# Patient Record
Sex: FEMALE | Race: WHITE | NOT HISPANIC OR LATINO | Employment: OTHER | URBAN - METROPOLITAN AREA
[De-identification: names, ages, dates, MRNs, and addresses within clinical notes are randomized per-mention and may not be internally consistent; named-entity substitution may affect disease eponyms.]

---

## 2017-01-01 ENCOUNTER — TELEPHONE (OUTPATIENT)
Dept: GYNECOLOGIC ONCOLOGY | Facility: CLINIC | Age: 42
End: 2017-01-01

## 2017-01-01 ENCOUNTER — OFFICE VISIT (OUTPATIENT)
Dept: OBSTETRICS AND GYNECOLOGY | Facility: CLINIC | Age: 42
End: 2017-01-01
Payer: MEDICAID

## 2017-01-01 ENCOUNTER — TELEPHONE (OUTPATIENT)
Dept: OBSTETRICS AND GYNECOLOGY | Facility: CLINIC | Age: 42
End: 2017-01-01

## 2017-01-01 ENCOUNTER — INITIAL CONSULT (OUTPATIENT)
Dept: GYNECOLOGIC ONCOLOGY | Facility: CLINIC | Age: 42
End: 2017-01-01
Attending: OBSTETRICS & GYNECOLOGY
Payer: MEDICAID

## 2017-01-01 ENCOUNTER — SURGERY (OUTPATIENT)
Age: 42
End: 2017-01-01

## 2017-01-01 VITALS
BODY MASS INDEX: 17.43 KG/M2 | DIASTOLIC BLOOD PRESSURE: 88 MMHG | SYSTOLIC BLOOD PRESSURE: 119 MMHG | WEIGHT: 108 LBS | HEART RATE: 118 BPM

## 2017-01-01 VITALS
HEIGHT: 66 IN | BODY MASS INDEX: 17.12 KG/M2 | DIASTOLIC BLOOD PRESSURE: 70 MMHG | SYSTOLIC BLOOD PRESSURE: 102 MMHG | WEIGHT: 106.5 LBS

## 2017-01-01 DIAGNOSIS — C51.9 VULVAR CANCER: Primary | ICD-10-CM

## 2017-01-01 DIAGNOSIS — A63.0 CONDYLOMA ACUMINATA: Primary | ICD-10-CM

## 2017-01-01 DIAGNOSIS — R10.2 VULVAR PAIN: ICD-10-CM

## 2017-01-01 DIAGNOSIS — Z12.31 VISIT FOR SCREENING MAMMOGRAM: ICD-10-CM

## 2017-01-01 DIAGNOSIS — R10.2 VULVAR PAIN: Primary | ICD-10-CM

## 2017-01-01 PROCEDURE — 99213 OFFICE O/P EST LOW 20 MIN: CPT | Mod: PBBFAC,PO | Performed by: OBSTETRICS & GYNECOLOGY

## 2017-01-01 PROCEDURE — 99205 OFFICE O/P NEW HI 60 MIN: CPT | Mod: S$PBB,,, | Performed by: OBSTETRICS & GYNECOLOGY

## 2017-01-01 PROCEDURE — 99999 PR PBB SHADOW E&M-EST. PATIENT-LVL II: CPT | Mod: PBBFAC,,, | Performed by: OBSTETRICS & GYNECOLOGY

## 2017-01-01 PROCEDURE — 99212 OFFICE O/P EST SF 10 MIN: CPT | Mod: PBBFAC | Performed by: OBSTETRICS & GYNECOLOGY

## 2017-01-01 PROCEDURE — 3008F BODY MASS INDEX DOCD: CPT | Mod: ,,, | Performed by: OBSTETRICS & GYNECOLOGY

## 2017-01-01 PROCEDURE — 99386 PREV VISIT NEW AGE 40-64: CPT | Mod: S$PBB,,, | Performed by: OBSTETRICS & GYNECOLOGY

## 2017-01-01 PROCEDURE — 99999 PR PBB SHADOW E&M-EST. PATIENT-LVL III: CPT | Mod: PBBFAC,,, | Performed by: OBSTETRICS & GYNECOLOGY

## 2017-01-01 RX ORDER — LIDOCAINE HYDROCHLORIDE 20 MG/ML
JELLY TOPICAL
Qty: 30 ML | Refills: 1 | Status: SHIPPED | OUTPATIENT
Start: 2017-01-01 | End: 2018-01-01

## 2017-01-01 RX ORDER — OXYCODONE AND ACETAMINOPHEN 5; 325 MG/1; MG/1
1 TABLET ORAL EVERY 4 HOURS PRN
Qty: 30 TABLET | Refills: 0 | Status: SHIPPED | OUTPATIENT
Start: 2017-01-01 | End: 2018-01-01

## 2017-01-01 RX ORDER — OXYCODONE AND ACETAMINOPHEN 5; 325 MG/1; MG/1
1 TABLET ORAL EVERY 4 HOURS PRN
Qty: 30 TABLET | Refills: 0 | OUTPATIENT
Start: 2017-01-01

## 2017-07-18 NOTE — TELEPHONE ENCOUNTER
----- Message from Keli Lay sent at 7/18/2017  1:53 PM CDT -----  Pt has cyst in vag area, draining, and having some pain. Pt can be reached at [phone] or [phone].

## 2017-07-26 NOTE — PROGRESS NOTES
SUBJECTIVE:   42 y.o. female .  for annual routine Pap and checkup. No LMP recorded..  She complains of vulvar lesion for over a year that started right tenzin-rectal area.  It has gotten progressively larger.  She was seen by a physician on Monday who treated her with south for superinfection.  Her last pap was 5 years ago.  She denies abnormal pap.  She has never had a mammogram.  She describes a 30# weight loss.  She has no difficulty urinating, but sitting and walking are uncomfortable.        History reviewed. No pertinent past medical history.  Past Surgical History:   Procedure Laterality Date     SECTION      4     Social History     Social History    Marital status: Single     Spouse name: N/A    Number of children: N/A    Years of education: N/A     Occupational History    Not on file.     Social History Main Topics    Smoking status: Current Every Day Smoker    Smokeless tobacco: Never Used    Alcohol use No    Drug use: No    Sexual activity: No     Other Topics Concern    Not on file     Social History Narrative    No narrative on file     Family History   Problem Relation Age of Onset    Throat cancer Maternal Grandmother     Breast cancer Mother      OB History   No data available         No current outpatient prescriptions on file.     No current facility-administered medications for this visit.      Allergies: Review of patient's allergies indicates no known allergies.     ROS:  Constitutional: 30# weight loss,no weight gain, fever, fatigue  Eyes:  No vision changes, glasses/contacts  ENT/Mouth: No ulcers, sinus problems, ears ringing, headache  Cardiovascular: No inability to lie flat, chest pain, exercise intolerance, swelling, heart palpitations  Respiratory: No wheezing, coughing blood, shortness of breath, or cough  Gastrointestinal: No diarrhea, bloody stool, nausea/vomiting, constipation, gas, hemorrhoids  Genitourinary: No blood in urine, painful urination, urgency of  "urination, frequency of urination, incomplete emptying, incontinence, abnormal bleeding, painful periods, heavy periods, vaginal discharge, vaginal odor, painful intercourse, sexual problems, bleeding after intercourse.  Musculoskeletal: No muscle weakness  Skin/Breast: No painful breasts, nipple discharge, masses, rash, ulcers  Neurological: No passing out, seizures, numbness, headache  Endocrine: No diabetes, hypothyroid, hyperthyroid, hot flashes, hair loss, abnormal hair growth, ance  Psychiatric: No depression, crying  Hematologic: No bruises, bleeding, swollen lymph nodes, anemia.      OBJECTIVE:   The patient appears well, alert, oriented x 3, in no distress.  /70   Ht 5' 6" (1.676 m)   Wt 48.3 kg (106 lb 7.7 oz)   BMI 17.19 kg/m²   NECK: no thyromegaly, trachea midline  SKIN: no acne, striae, hirsutism  BREAST EXAM: breasts appear normal, no suspicious masses, no skin or nipple changes or axillary nodes  ABDOMEN: no hernias, masses, or hepatosplenomegaly  GENITALIA: large condyloma obstructing the introitus and extending down the perineum to the right of the rectum.  No erythema.  I can pass a finger into the vagina and do not feel any extension  URETHRA: normal urethra, normal urethral meatus  VAGINA: patent to finger  NODES: no inguinal lymphadenopathy    \  ASSESSMENT:   .Sarahi was seen today for other.    Diagnoses and all orders for this visit:    Condyloma acuminata  -     Ambulatory consult to Gynecologic Oncology    Visit for screening mammogram  -     Mammo Digital Screening Bilat with CAD; Future    Discussed with Dr. Bruner.  Will consult gyn onc for incision.  "

## 2017-08-07 NOTE — LETTER
August 7, 2017      Minnie Gonzalez MD  [address]  [address]  [facility] [address]           [facility] - Gynecologic Oncology  [address], [address]  [facility] [address]  Phone: [phone]  Fax: [phone]          Patient: Sarahi Marquez   MR Number: [id number]   YOB: 1975   Date of Visit: 8/7/2017       Dear Dr. Minnie Gonzalez:    Thank you for referring Sarahi Marquez to me for evaluation. Attached you will find relevant portions of my assessment and plan of care.    If you have questions, please do not hesitate to call me. I look forward to following Sarahi Marquez along with you.    Sincerely,    Robert Bruner MD    Enclosure  CC:  No Recipients    If you would like to receive this communication electronically, please contact [email or web address] or [phone] to request more information on [facility] access.    For providers and/or their staff who would like to refer a patient to [facility], please contact us through our one-stop-shop provider referral line, [facility], at [phone].    If you feel you have received this communication in error or would no longer like to receive these types of communications, please e-mail [email or web address]

## 2017-08-07 NOTE — TELEPHONE ENCOUNTER
----- Message from Frances Astorga sent at 8/7/2017 12:21 PM CDT -----  Contact: Self  Good afternoon,    Pt stated the dr was suppose to call in some lidocane cream for her and she has not heard back yet. Pt stated she called pharmacy and it's not there.    Pt can be reached at [phone] or [phone]     Thank you!

## 2017-08-07 NOTE — PROGRESS NOTES
Subjective:       Patient ID: Sarahi Marquez is a 42 y.o. female.    Chief Complaint: condyloma (Referred by Dr. Gonzalez)    HPI     Ms. Marquez is a 42yr old para 4 who presents today as a referral from Dr. ALEJANDRO Gonzalez for evaluation of a condyloma. Pt reports started as a small ball around the vagina that has grown for a year. Pt reports pain when sitting and walking, pain scale 7-8/10.   Pt also reports trouble swallowing and eating with 30# weight loss in the last 3 months (BMI 17). LMP >10yr ago. No history of abnormal paps. Last pap 5 yr ago. No new partners. No history of STI's.  So significant medical history. Surgical: LTV C/S x 4. FMH: M-breast CA (64, alive); MGM-throat CA (smoker) Pt has 4pk/d x 25yr tobacco use; decreased to <1pk a day now. No ETOH or drug use.      Review of Systems   Constitutional: Positive for appetite change and unexpected weight change. Negative for chills, diaphoresis, fatigue and fever.   HENT: Positive for trouble swallowing.    Respiratory: Positive for cough. Negative for chest tightness and shortness of breath.    Cardiovascular: Negative for chest pain, palpitations and leg swelling.   Gastrointestinal: Positive for constipation. Negative for abdominal pain, diarrhea, nausea and vomiting.   Genitourinary: Positive for dysuria, genital sores and vaginal pain. Negative for difficulty urinating, flank pain, frequency, hematuria, pelvic pain, urgency and vaginal discharge.   Skin: Negative for color change.   Neurological: Negative for dizziness, light-headedness and headaches.   Hematological: Negative for adenopathy.   Psychiatric/Behavioral: Negative for agitation. The patient is nervous/anxious.    All other systems reviewed and are negative.      Objective:      Physical Exam   Constitutional: She is oriented to person, place, and time. She appears well-developed. No distress.   cachexic   HENT:   Head: Normocephalic.   Eyes: No scleral icterus.   Neck: Normal range of motion. No  thyromegaly present.   Pulmonary/Chest: Effort normal. No respiratory distress. She has no wheezes. She has no rales.   Abdominal: Soft. She exhibits no distension. There is no tenderness.   Genitourinary:       No breast tenderness. Pelvic exam was performed with patient supine. There is lesion on the right labia. There is lesion on the left labia.   Genitourinary Comments: Unable to assess by internal exam. Externally cancer extending from 3-9 o'clock on labia bilaterally and eroding into the peritoneal body.   Musculoskeletal: Normal range of motion. She exhibits no edema or tenderness.   Lymphadenopathy:     She has no cervical adenopathy.   Neurological: She is alert and oriented to person, place, and time.   Skin: Skin is warm and dry. No rash noted. She is not diaphoretic. No erythema. No pallor.   Psychiatric: She has a normal mood and affect. Her behavior is normal.   Vitals reviewed.      Assessment:       1. Vulvar cancer        Plan:       - Needs tissue diagnosis for what appears to be a stage 3 vulvar cancer. Will schedule for EUA, vulvar biopsies  - schedule with ENT for evaluation of inability to swallow, weight loss, family history, possible concurrent bronchoscopy while under anesthesia  - The risks, benefits, and indications for surgery were discussed with the patient. These included bleeding, infection, damage to surrounding tissues, and the possibility of major complications including death. She voiced understanding, all questions were answered and consents were signed.

## 2017-08-07 NOTE — Clinical Note
I think this is probably a vulvar cancer.  In addition, she may have an esophageal versus laryngeal cancer as well.  Will let you know. JE

## 2017-08-24 NOTE — TELEPHONE ENCOUNTER
----- Message from Bryon Maher sent at 8/24/2017 11:39 AM CDT -----  Contact: pt   Pt will to r/s surgery     Pt will like to prescribed to an antibiotics and pain med    Pt contact: [phone]

## 2017-08-25 NOTE — TELEPHONE ENCOUNTER
Returned phone call again. Person who answered the phone said she left the house again. No other number available.

## 2017-08-25 NOTE — TELEPHONE ENCOUNTER
Patient requesting antibiotics and pain medications. Called to speak with patient to understand more about her requests and what I could do to help. She was not available but the person that answered said she would give her a message to call our office back. The person also states that there is no alternative number.

## 2017-08-25 NOTE — TELEPHONE ENCOUNTER
----- Message from Gloria Rice sent at 8/25/2017  1:57 PM CDT -----  Sarahi Jimmy said she is needing antibiotics & pain medications/pt said it is difficult to sit down/pt can be reached at [phone]       [facility]# [id number]

## 2017-08-28 NOTE — TELEPHONE ENCOUNTER
----- Message from Emma Galindo MD sent at 8/25/2017  1:33 PM CDT -----  Contact: pt   I tried to call this patient to understand more about what she needs. If you hear from her let me know so I can see how I can help. I didn't just want to send random antibiotics to the pharmacy. She needs the EUA with Zohreh.   ----- Message -----  From: Eunice Villa MA  Sent: 8/24/2017   2:50 PM  To: Emma Galindo MD    Pt is requesting  pain meds an abx for drainage  ----- Message -----  From: Bryon Maher  Sent: 8/24/2017  11:39 AM  To: [facility] Staff    Pt will to r/s surgery     Pt will like to prescribed to an antibiotics and pain med    Pt contact: [phone]

## 2017-08-28 NOTE — TELEPHONE ENCOUNTER
----- Message from Gloria Rice sent at 8/28/2017  2:24 PM CDT -----  Sarahi Marquez is needing pain meds to be sent to [facility]/# [phone]     Ms Marquez can be reached at [phone]    [facility]# [id number]

## 2017-09-20 NOTE — TELEPHONE ENCOUNTER
----- Message from Bryon Maher sent at 9/20/2017  4:41 PM CDT -----  Contact: Pt  Pt states she was supposed to be prescribed to a new medication to relieve pain, but still hasn't received anything     Contact::[phone]

## 2017-09-20 NOTE — TELEPHONE ENCOUNTER
----- Message from Gloria Rice sent at 9/20/2017  2:43 PM CDT -----  Sarahi Marquez is calling for more pain medication & needs a stronger dosage/pt can be reached at [phone]

## 2017-10-09 NOTE — TELEPHONE ENCOUNTER
----- Message from Susie Elizabeth sent at 10/9/2017 12:16 PM CDT -----  Contact: pt      _  1st Request  _  2nd Request  _  3rd Request    Please refill the medication(s) listed below. Please call the patient when the prescription(s) is ready for  at this phone number      [phone]      Medication #1hydrocodone-acetaminophen 7.5-325mg (NORCO) 7.5-325 mg per tablet - she wants a stronger dose    Medication #2      Preferred [facility]acy:[facility] in [place]

## 2017-10-09 NOTE — TELEPHONE ENCOUNTER
10/09/17 advised pt cliic appt is needed before pain meds can be refilled. Pt schd to see Dr. Bruner on 10/13 @ 10:30 am. Pt is aware of appt time. TA/MA

## 2018-01-01 ENCOUNTER — HOSPITAL ENCOUNTER (INPATIENT)
Facility: OTHER | Age: 43
LOS: 3 days | DRG: 917 | End: 2018-03-08
Attending: HOSPITALIST | Admitting: HOSPITALIST
Payer: MEDICAID

## 2018-01-01 VITALS
SYSTOLIC BLOOD PRESSURE: 92 MMHG | BODY MASS INDEX: 17.96 KG/M2 | WEIGHT: 107.81 LBS | HEART RATE: 106 BPM | TEMPERATURE: 95 F | DIASTOLIC BLOOD PRESSURE: 62 MMHG | OXYGEN SATURATION: 45 % | RESPIRATION RATE: 10 BRPM | HEIGHT: 65 IN

## 2018-01-01 DIAGNOSIS — I46.9 CARDIAC ARREST: Primary | ICD-10-CM

## 2018-01-01 DIAGNOSIS — R41.9 DECREASED ALERTNESS: ICD-10-CM

## 2018-01-01 LAB
ABO + RH BLD: NORMAL
ALBUMIN SERPL BCP-MCNC: 1.5 G/DL
ALBUMIN SERPL BCP-MCNC: 1.6 G/DL
ALBUMIN SERPL BCP-MCNC: 1.7 G/DL
ALBUMIN SERPL BCP-MCNC: 1.7 G/DL
ALBUMIN SERPL BCP-MCNC: 1.8 G/DL
ALBUMIN SERPL BCP-MCNC: 1.8 G/DL
ALBUMIN SERPL BCP-MCNC: 1.9 G/DL
ALBUMIN SERPL BCP-MCNC: 2.1 G/DL
ALBUMIN SERPL BCP-MCNC: 2.2 G/DL
ALBUMIN SERPL BCP-MCNC: 2.3 G/DL
ALLENS TEST: ABNORMAL
ALP SERPL-CCNC: 103 U/L
ALP SERPL-CCNC: 66 U/L
ALP SERPL-CCNC: 68 U/L
ALP SERPL-CCNC: 70 U/L
ALP SERPL-CCNC: 73 U/L
ALP SERPL-CCNC: 75 U/L
ALP SERPL-CCNC: 81 U/L
ALP SERPL-CCNC: 87 U/L
ALP SERPL-CCNC: 91 U/L
ALP SERPL-CCNC: 96 U/L
ALT SERPL W/O P-5'-P-CCNC: 33 U/L
ALT SERPL W/O P-5'-P-CCNC: 37 U/L
ALT SERPL W/O P-5'-P-CCNC: 37 U/L
ALT SERPL W/O P-5'-P-CCNC: 38 U/L
ALT SERPL W/O P-5'-P-CCNC: 40 U/L
ALT SERPL W/O P-5'-P-CCNC: 42 U/L
ALT SERPL W/O P-5'-P-CCNC: 44 U/L
ALT SERPL W/O P-5'-P-CCNC: 48 U/L
ALT SERPL W/O P-5'-P-CCNC: 52 U/L
ALT SERPL W/O P-5'-P-CCNC: 56 U/L
ALT SERPL W/O P-5'-P-CCNC: 58 U/L
ALT SERPL W/O P-5'-P-CCNC: 61 U/L
AMORPH CRY URNS QL MICRO: ABNORMAL
ANION GAP SERPL CALC-SCNC: 12 MMOL/L
ANION GAP SERPL CALC-SCNC: 12 MMOL/L
ANION GAP SERPL CALC-SCNC: 13 MMOL/L
ANION GAP SERPL CALC-SCNC: 13 MMOL/L
ANION GAP SERPL CALC-SCNC: 14 MMOL/L
ANION GAP SERPL CALC-SCNC: 15 MMOL/L
ANION GAP SERPL CALC-SCNC: 15 MMOL/L
ANION GAP SERPL CALC-SCNC: 16 MMOL/L
ANION GAP SERPL CALC-SCNC: 17 MMOL/L
ANISOCYTOSIS BLD QL SMEAR: ABNORMAL
ANISOCYTOSIS BLD QL SMEAR: SLIGHT
APTT BLDCRRT: 30.8 SEC
APTT BLDCRRT: 30.9 SEC
APTT BLDCRRT: 31.4 SEC
APTT BLDCRRT: 31.9 SEC
APTT BLDCRRT: 32.6 SEC
APTT BLDCRRT: 35.2 SEC
APTT BLDCRRT: 38.3 SEC
APTT BLDCRRT: 38.3 SEC
APTT BLDCRRT: 38.7 SEC
APTT BLDCRRT: 41.8 SEC
APTT BLDCRRT: 42.5 SEC
APTT BLDCRRT: 47.6 SEC
AST SERPL-CCNC: 117 U/L
AST SERPL-CCNC: 126 U/L
AST SERPL-CCNC: 145 U/L
AST SERPL-CCNC: 176 U/L
AST SERPL-CCNC: 217 U/L
AST SERPL-CCNC: 243 U/L
AST SERPL-CCNC: 257 U/L
AST SERPL-CCNC: 265 U/L
AST SERPL-CCNC: 282 U/L
AST SERPL-CCNC: 289 U/L
AST SERPL-CCNC: 79 U/L
AST SERPL-CCNC: 96 U/L
BACTERIA #/AREA URNS HPF: ABNORMAL /HPF
BACTERIA SPEC AEROBE CULT: NORMAL
BACTERIA UR CULT: NO GROWTH
BASO STIPL BLD QL SMEAR: ABNORMAL
BASOPHILS # BLD AUTO: 0.01 K/UL
BASOPHILS # BLD AUTO: 0.02 K/UL
BASOPHILS # BLD AUTO: 0.03 K/UL
BASOPHILS # BLD AUTO: 0.07 K/UL
BASOPHILS # BLD AUTO: 0.07 K/UL
BASOPHILS # BLD AUTO: ABNORMAL K/UL
BASOPHILS NFR BLD: 0 %
BASOPHILS NFR BLD: 0.1 %
BASOPHILS NFR BLD: 0.2 %
BASOPHILS NFR BLD: 0.3 %
BASOPHILS NFR BLD: 0.3 %
BASOPHILS NFR BLD: 0.8 %
BASOPHILS NFR BLD: 1 %
BILIRUB SERPL-MCNC: 0.2 MG/DL
BILIRUB SERPL-MCNC: 0.3 MG/DL
BILIRUB SERPL-MCNC: 0.3 MG/DL
BILIRUB SERPL-MCNC: 0.4 MG/DL
BILIRUB SERPL-MCNC: 0.5 MG/DL
BILIRUB SERPL-MCNC: 0.6 MG/DL
BILIRUB SERPL-MCNC: 0.7 MG/DL
BILIRUB UR QL STRIP: NEGATIVE
BLD GP AB SCN CELLS X3 SERPL QL: NORMAL
BNP SERPL-MCNC: 27 PG/ML
BUN SERPL-MCNC: 17 MG/DL
BUN SERPL-MCNC: 18 MG/DL
BUN SERPL-MCNC: 20 MG/DL
BUN SERPL-MCNC: 24 MG/DL
BUN SERPL-MCNC: 26 MG/DL
BUN SERPL-MCNC: 27 MG/DL
BUN SERPL-MCNC: 30 MG/DL
BUN SERPL-MCNC: 32 MG/DL
BUN SERPL-MCNC: 33 MG/DL
BUN SERPL-MCNC: 36 MG/DL
BUN SERPL-MCNC: 39 MG/DL
BUN SERPL-MCNC: 42 MG/DL
BURR CELLS BLD QL SMEAR: ABNORMAL
CA-I BLDV-SCNC: 1.08 MMOL/L
CA-I BLDV-SCNC: 1.09 MMOL/L
CA-I BLDV-SCNC: 1.09 MMOL/L
CA-I BLDV-SCNC: 1.1 MMOL/L
CA-I BLDV-SCNC: 1.1 MMOL/L
CA-I BLDV-SCNC: 1.14 MMOL/L
CA-I BLDV-SCNC: 1.17 MMOL/L
CALCIUM SERPL-MCNC: 7.8 MG/DL
CALCIUM SERPL-MCNC: 7.8 MG/DL
CALCIUM SERPL-MCNC: 7.9 MG/DL
CALCIUM SERPL-MCNC: 8.1 MG/DL
CALCIUM SERPL-MCNC: 8.1 MG/DL
CALCIUM SERPL-MCNC: 8.3 MG/DL
CALCIUM SERPL-MCNC: 8.4 MG/DL
CALCIUM SERPL-MCNC: 8.4 MG/DL
CALCIUM SERPL-MCNC: 8.5 MG/DL
CALCIUM SERPL-MCNC: 8.7 MG/DL
CALCIUM SERPL-MCNC: 9 MG/DL
CALCIUM SERPL-MCNC: 9.1 MG/DL
CHLORIDE SERPL-SCNC: 100 MMOL/L
CHLORIDE SERPL-SCNC: 100 MMOL/L
CHLORIDE SERPL-SCNC: 101 MMOL/L
CHLORIDE SERPL-SCNC: 102 MMOL/L
CHLORIDE SERPL-SCNC: 105 MMOL/L
CHLORIDE SERPL-SCNC: 107 MMOL/L
CHLORIDE SERPL-SCNC: 107 MMOL/L
CHLORIDE SERPL-SCNC: 99 MMOL/L
CHLORIDE SERPL-SCNC: 99 MMOL/L
CHOLEST SERPL-MCNC: 109 MG/DL
CHOLEST/HDLC SERPL: 3.4 {RATIO}
CK SERPL-CCNC: 6447 U/L
CLARITY UR: CLEAR
CO2 SERPL-SCNC: 16 MMOL/L
CO2 SERPL-SCNC: 17 MMOL/L
CO2 SERPL-SCNC: 17 MMOL/L
CO2 SERPL-SCNC: 18 MMOL/L
CO2 SERPL-SCNC: 18 MMOL/L
CO2 SERPL-SCNC: 19 MMOL/L
CO2 SERPL-SCNC: 20 MMOL/L
CO2 SERPL-SCNC: 21 MMOL/L
CO2 SERPL-SCNC: 22 MMOL/L
COLOR UR: YELLOW
CREAT SERPL-MCNC: 1.3 MG/DL
CREAT SERPL-MCNC: 1.5 MG/DL
CREAT SERPL-MCNC: 1.7 MG/DL
CREAT SERPL-MCNC: 2.1 MG/DL
CREAT SERPL-MCNC: 2.2 MG/DL
CREAT SERPL-MCNC: 2.5 MG/DL
CREAT SERPL-MCNC: 2.9 MG/DL
CREAT SERPL-MCNC: 3.4 MG/DL
CREAT SERPL-MCNC: 3.6 MG/DL
CREAT SERPL-MCNC: 4 MG/DL
CREAT SERPL-MCNC: 4.4 MG/DL
CREAT SERPL-MCNC: 4.8 MG/DL
DELSYS: ABNORMAL
DIASTOLIC DYSFUNCTION: YES
DIFFERENTIAL METHOD: ABNORMAL
EOSINOPHIL # BLD AUTO: 0 K/UL
EOSINOPHIL # BLD AUTO: 0.1 K/UL
EOSINOPHIL # BLD AUTO: 0.2 K/UL
EOSINOPHIL # BLD AUTO: 0.2 K/UL
EOSINOPHIL # BLD AUTO: ABNORMAL K/UL
EOSINOPHIL NFR BLD: 0 %
EOSINOPHIL NFR BLD: 0.3 %
EOSINOPHIL NFR BLD: 0.3 %
EOSINOPHIL NFR BLD: 0.4 %
EOSINOPHIL NFR BLD: 0.9 %
EOSINOPHIL NFR BLD: 1 %
EOSINOPHIL NFR BLD: 1.3 %
EOSINOPHIL NFR BLD: 1.6 %
EOSINOPHIL NFR BLD: 1.8 %
EOSINOPHIL NFR BLD: 2 %
ERYTHROCYTE [DISTWIDTH] IN BLOOD BY AUTOMATED COUNT: 21.4 %
ERYTHROCYTE [DISTWIDTH] IN BLOOD BY AUTOMATED COUNT: 21.5 %
ERYTHROCYTE [DISTWIDTH] IN BLOOD BY AUTOMATED COUNT: 21.5 %
ERYTHROCYTE [DISTWIDTH] IN BLOOD BY AUTOMATED COUNT: 21.7 %
ERYTHROCYTE [DISTWIDTH] IN BLOOD BY AUTOMATED COUNT: 21.8 %
ERYTHROCYTE [DISTWIDTH] IN BLOOD BY AUTOMATED COUNT: 21.9 %
ERYTHROCYTE [DISTWIDTH] IN BLOOD BY AUTOMATED COUNT: 22 %
ERYTHROCYTE [DISTWIDTH] IN BLOOD BY AUTOMATED COUNT: 22.3 %
ERYTHROCYTE [SEDIMENTATION RATE] IN BLOOD BY WESTERGREN METHOD: 20 MM/H
ERYTHROCYTE [SEDIMENTATION RATE] IN BLOOD BY WESTERGREN METHOD: 24 MM/H
ERYTHROCYTE [SEDIMENTATION RATE] IN BLOOD BY WESTERGREN METHOD: 26 MM/H
ERYTHROCYTE [SEDIMENTATION RATE] IN BLOOD BY WESTERGREN METHOD: 26 MM/H
EST. GFR  (AFRICAN AMERICAN): 12 ML/MIN/1.73 M^2
EST. GFR  (AFRICAN AMERICAN): 13 ML/MIN/1.73 M^2
EST. GFR  (AFRICAN AMERICAN): 15 ML/MIN/1.73 M^2
EST. GFR  (AFRICAN AMERICAN): 17 ML/MIN/1.73 M^2
EST. GFR  (AFRICAN AMERICAN): 18 ML/MIN/1.73 M^2
EST. GFR  (AFRICAN AMERICAN): 22 ML/MIN/1.73 M^2
EST. GFR  (AFRICAN AMERICAN): 26 ML/MIN/1.73 M^2
EST. GFR  (AFRICAN AMERICAN): 30 ML/MIN/1.73 M^2
EST. GFR  (AFRICAN AMERICAN): 32 ML/MIN/1.73 M^2
EST. GFR  (AFRICAN AMERICAN): 41 ML/MIN/1.73 M^2
EST. GFR  (AFRICAN AMERICAN): 48 ML/MIN/1.73 M^2
EST. GFR  (AFRICAN AMERICAN): 57 ML/MIN/1.73 M^2
EST. GFR  (NON AFRICAN AMERICAN): 10 ML/MIN/1.73 M^2
EST. GFR  (NON AFRICAN AMERICAN): 11 ML/MIN/1.73 M^2
EST. GFR  (NON AFRICAN AMERICAN): 13 ML/MIN/1.73 M^2
EST. GFR  (NON AFRICAN AMERICAN): 14 ML/MIN/1.73 M^2
EST. GFR  (NON AFRICAN AMERICAN): 16 ML/MIN/1.73 M^2
EST. GFR  (NON AFRICAN AMERICAN): 19 ML/MIN/1.73 M^2
EST. GFR  (NON AFRICAN AMERICAN): 23 ML/MIN/1.73 M^2
EST. GFR  (NON AFRICAN AMERICAN): 26 ML/MIN/1.73 M^2
EST. GFR  (NON AFRICAN AMERICAN): 28 ML/MIN/1.73 M^2
EST. GFR  (NON AFRICAN AMERICAN): 36 ML/MIN/1.73 M^2
EST. GFR  (NON AFRICAN AMERICAN): 42 ML/MIN/1.73 M^2
EST. GFR  (NON AFRICAN AMERICAN): 50 ML/MIN/1.73 M^2
ESTIMATED PA SYSTOLIC PRESSURE: 7.84
ETCO2: 20
ETCO2: 23
ETCO2: 23
ETCO2: 24
FIO2: 40
FIO2: 40
FIO2: 50
FIO2: 50
GIANT PLATELETS BLD QL SMEAR: PRESENT
GIANT PLATELETS BLD QL SMEAR: PRESENT
GLUCOSE SERPL-MCNC: 100 MG/DL
GLUCOSE SERPL-MCNC: 101 MG/DL
GLUCOSE SERPL-MCNC: 105 MG/DL
GLUCOSE SERPL-MCNC: 109 MG/DL
GLUCOSE SERPL-MCNC: 118 MG/DL
GLUCOSE SERPL-MCNC: 241 MG/DL
GLUCOSE SERPL-MCNC: 296 MG/DL
GLUCOSE SERPL-MCNC: 60 MG/DL
GLUCOSE SERPL-MCNC: 64 MG/DL
GLUCOSE SERPL-MCNC: 87 MG/DL
GLUCOSE SERPL-MCNC: 97 MG/DL
GLUCOSE SERPL-MCNC: 98 MG/DL
GLUCOSE SERPL-MCNC: 98 MG/DL
GLUCOSE UR QL STRIP: ABNORMAL
GRAM STN SPEC: NORMAL
HCO3 UR-SCNC: 20.6 MMOL/L (ref 24–28)
HCO3 UR-SCNC: 20.7 MMOL/L (ref 24–28)
HCO3 UR-SCNC: 21 MMOL/L (ref 24–28)
HCO3 UR-SCNC: 21.6 MMOL/L (ref 24–28)
HCT VFR BLD AUTO: 19.4 %
HCT VFR BLD AUTO: 21.2 %
HCT VFR BLD AUTO: 24.3 %
HCT VFR BLD AUTO: 25 %
HCT VFR BLD AUTO: 25.3 %
HCT VFR BLD AUTO: 26.9 %
HCT VFR BLD AUTO: 28.3 %
HCT VFR BLD AUTO: 29.6 %
HCT VFR BLD AUTO: 30.1 %
HCT VFR BLD AUTO: 30.8 %
HCT VFR BLD AUTO: 31.9 %
HCT VFR BLD AUTO: 33 %
HDLC SERPL-MCNC: 32 MG/DL
HDLC SERPL: 29.4 %
HGB BLD-MCNC: 10.1 G/DL
HGB BLD-MCNC: 10.2 G/DL
HGB BLD-MCNC: 10.7 G/DL
HGB BLD-MCNC: 6.6 G/DL
HGB BLD-MCNC: 7.2 G/DL
HGB BLD-MCNC: 8.1 G/DL
HGB BLD-MCNC: 8.3 G/DL
HGB BLD-MCNC: 8.5 G/DL
HGB BLD-MCNC: 9 G/DL
HGB BLD-MCNC: 9.5 G/DL
HGB BLD-MCNC: 9.9 G/DL
HGB BLD-MCNC: 9.9 G/DL
HGB UR QL STRIP: ABNORMAL
HYALINE CASTS #/AREA URNS LPF: 0 /LPF
HYPOCHROMIA BLD QL SMEAR: ABNORMAL
INR PPP: 1
INR PPP: 1
INR PPP: 1.1
INR PPP: 1.2
KETONES UR QL STRIP: NEGATIVE
LACTATE SERPL-SCNC: 2.7 MMOL/L
LACTATE SERPL-SCNC: 3.7 MMOL/L
LACTATE SERPL-SCNC: 4.5 MMOL/L
LACTATE SERPL-SCNC: 5.3 MMOL/L
LDLC SERPL CALC-MCNC: 63.2 MG/DL
LEUKOCYTE ESTERASE UR QL STRIP: NEGATIVE
LYMPHOCYTES # BLD AUTO: 0.3 K/UL
LYMPHOCYTES # BLD AUTO: 0.4 K/UL
LYMPHOCYTES # BLD AUTO: 0.5 K/UL
LYMPHOCYTES # BLD AUTO: 0.5 K/UL
LYMPHOCYTES # BLD AUTO: 0.6 K/UL
LYMPHOCYTES # BLD AUTO: 0.6 K/UL
LYMPHOCYTES # BLD AUTO: 0.7 K/UL
LYMPHOCYTES # BLD AUTO: 0.7 K/UL
LYMPHOCYTES # BLD AUTO: ABNORMAL K/UL
LYMPHOCYTES NFR BLD: 2.8 %
LYMPHOCYTES NFR BLD: 3 %
LYMPHOCYTES NFR BLD: 3 %
LYMPHOCYTES NFR BLD: 3.5 %
LYMPHOCYTES NFR BLD: 3.9 %
LYMPHOCYTES NFR BLD: 4.2 %
LYMPHOCYTES NFR BLD: 4.5 %
LYMPHOCYTES NFR BLD: 5 %
LYMPHOCYTES NFR BLD: 5.1 %
LYMPHOCYTES NFR BLD: 5.3 %
LYMPHOCYTES NFR BLD: 5.9 %
LYMPHOCYTES NFR BLD: 9 %
MAGNESIUM SERPL-MCNC: 1.4 MG/DL
MAGNESIUM SERPL-MCNC: 1.6 MG/DL
MAGNESIUM SERPL-MCNC: 1.6 MG/DL
MAGNESIUM SERPL-MCNC: 1.7 MG/DL
MAGNESIUM SERPL-MCNC: 1.8 MG/DL
MAGNESIUM SERPL-MCNC: 1.9 MG/DL
MAGNESIUM SERPL-MCNC: 1.9 MG/DL
MAGNESIUM SERPL-MCNC: 2 MG/DL
MAGNESIUM SERPL-MCNC: 2.2 MG/DL
MAGNESIUM SERPL-MCNC: 2.3 MG/DL
MCH RBC QN AUTO: 29 PG
MCH RBC QN AUTO: 29.1 PG
MCH RBC QN AUTO: 29.4 PG
MCH RBC QN AUTO: 29.5 PG
MCH RBC QN AUTO: 29.6 PG
MCH RBC QN AUTO: 29.7 PG
MCH RBC QN AUTO: 29.7 PG
MCH RBC QN AUTO: 29.8 PG
MCH RBC QN AUTO: 29.8 PG
MCH RBC QN AUTO: 30.1 PG
MCHC RBC AUTO-ENTMCNC: 32 G/DL
MCHC RBC AUTO-ENTMCNC: 32.4 G/DL
MCHC RBC AUTO-ENTMCNC: 32.8 G/DL
MCHC RBC AUTO-ENTMCNC: 32.9 G/DL
MCHC RBC AUTO-ENTMCNC: 33.2 G/DL
MCHC RBC AUTO-ENTMCNC: 33.3 G/DL
MCHC RBC AUTO-ENTMCNC: 33.4 G/DL
MCHC RBC AUTO-ENTMCNC: 33.5 G/DL
MCHC RBC AUTO-ENTMCNC: 33.6 G/DL
MCHC RBC AUTO-ENTMCNC: 33.6 G/DL
MCHC RBC AUTO-ENTMCNC: 34 G/DL
MCHC RBC AUTO-ENTMCNC: 34 G/DL
MCV RBC AUTO: 87 FL
MCV RBC AUTO: 88 FL
MCV RBC AUTO: 89 FL
MCV RBC AUTO: 89 FL
MCV RBC AUTO: 91 FL
MCV RBC AUTO: 93 FL
MCV RBC AUTO: 93 FL
METAMYELOCYTES NFR BLD MANUAL: 3 %
METAMYELOCYTES NFR BLD MANUAL: 4 %
METAMYELOCYTES NFR BLD MANUAL: 6 %
METAMYELOCYTES NFR BLD MANUAL: 6 %
MICROSCOPIC COMMENT: ABNORMAL
MODE: ABNORMAL
MONOCYTES # BLD AUTO: 0.5 K/UL
MONOCYTES # BLD AUTO: 0.6 K/UL
MONOCYTES # BLD AUTO: 0.6 K/UL
MONOCYTES # BLD AUTO: 0.7 K/UL
MONOCYTES # BLD AUTO: 0.9 K/UL
MONOCYTES # BLD AUTO: 1.4 K/UL
MONOCYTES # BLD AUTO: ABNORMAL K/UL
MONOCYTES NFR BLD: 3 %
MONOCYTES NFR BLD: 3 %
MONOCYTES NFR BLD: 4 %
MONOCYTES NFR BLD: 5 %
MONOCYTES NFR BLD: 5 %
MONOCYTES NFR BLD: 5.3 %
MONOCYTES NFR BLD: 5.8 %
MONOCYTES NFR BLD: 5.9 %
MONOCYTES NFR BLD: 6.3 %
MONOCYTES NFR BLD: 6.4 %
MONOCYTES NFR BLD: 7.1 %
MONOCYTES NFR BLD: 7.5 %
MYELOCYTES NFR BLD MANUAL: 1 %
NEUTROPHILS # BLD AUTO: 10.1 K/UL
NEUTROPHILS # BLD AUTO: 10.2 K/UL
NEUTROPHILS # BLD AUTO: 11.2 K/UL
NEUTROPHILS # BLD AUTO: 21.2 K/UL
NEUTROPHILS # BLD AUTO: 7.8 K/UL
NEUTROPHILS # BLD AUTO: 8.1 K/UL
NEUTROPHILS # BLD AUTO: 9.2 K/UL
NEUTROPHILS # BLD AUTO: 9.3 K/UL
NEUTROPHILS # BLD AUTO: ABNORMAL K/UL
NEUTROPHILS NFR BLD: 52 %
NEUTROPHILS NFR BLD: 57 %
NEUTROPHILS NFR BLD: 63 %
NEUTROPHILS NFR BLD: 71 %
NEUTROPHILS NFR BLD: 85.6 %
NEUTROPHILS NFR BLD: 85.9 %
NEUTROPHILS NFR BLD: 87.5 %
NEUTROPHILS NFR BLD: 87.7 %
NEUTROPHILS NFR BLD: 88 %
NEUTROPHILS NFR BLD: 88.8 %
NEUTROPHILS NFR BLD: 88.9 %
NEUTROPHILS NFR BLD: 89.4 %
NEUTS BAND NFR BLD MANUAL: 14 %
NEUTS BAND NFR BLD MANUAL: 27 %
NEUTS BAND NFR BLD MANUAL: 28 %
NEUTS BAND NFR BLD MANUAL: 29 %
NITRITE UR QL STRIP: NEGATIVE
NONHDLC SERPL-MCNC: 77 MG/DL
NSE SERPL-MCNC: 34 NG/ML
OVALOCYTES BLD QL SMEAR: ABNORMAL
PCO2 BLDA: 39.2 MMHG (ref 35–45)
PCO2 BLDA: 43.2 MMHG (ref 35–45)
PCO2 BLDA: 44.4 MMHG (ref 35–45)
PCO2 BLDA: 47.9 MMHG (ref 35–45)
PEEP: 5
PH SMN: 7.24 [PH] (ref 7.35–7.45)
PH SMN: 7.28 [PH] (ref 7.35–7.45)
PH SMN: 7.31 [PH] (ref 7.35–7.45)
PH SMN: 7.33 [PH] (ref 7.35–7.45)
PH UR STRIP: 6 [PH] (ref 5–8)
PHOSPHATE SERPL-MCNC: 2.8 MG/DL
PHOSPHATE SERPL-MCNC: 3 MG/DL
PHOSPHATE SERPL-MCNC: 4 MG/DL
PHOSPHATE SERPL-MCNC: 4.7 MG/DL
PHOSPHATE SERPL-MCNC: 4.7 MG/DL
PHOSPHATE SERPL-MCNC: 4.8 MG/DL
PHOSPHATE SERPL-MCNC: 5.6 MG/DL
PHOSPHATE SERPL-MCNC: 5.8 MG/DL
PHOSPHATE SERPL-MCNC: 5.8 MG/DL
PHOSPHATE SERPL-MCNC: 6.1 MG/DL
PHOSPHATE SERPL-MCNC: 6.1 MG/DL
PHOSPHATE SERPL-MCNC: 6.4 MG/DL
PLATELET # BLD AUTO: 151 K/UL
PLATELET # BLD AUTO: 154 K/UL
PLATELET # BLD AUTO: 193 K/UL
PLATELET # BLD AUTO: 197 K/UL
PLATELET # BLD AUTO: 201 K/UL
PLATELET # BLD AUTO: 202 K/UL
PLATELET # BLD AUTO: 211 K/UL
PLATELET # BLD AUTO: 255 K/UL
PLATELET # BLD AUTO: 261 K/UL
PLATELET # BLD AUTO: 364 K/UL
PLATELET # BLD AUTO: 446 K/UL
PLATELET # BLD AUTO: 473 K/UL
PLATELET BLD QL SMEAR: ABNORMAL
PMV BLD AUTO: 8.6 FL
PMV BLD AUTO: 8.7 FL
PMV BLD AUTO: 8.7 FL
PMV BLD AUTO: 8.9 FL
PMV BLD AUTO: 8.9 FL
PMV BLD AUTO: 9.1 FL
PMV BLD AUTO: 9.1 FL
PMV BLD AUTO: 9.2 FL
PMV BLD AUTO: 9.3 FL
PMV BLD AUTO: 9.5 FL
PO2 BLDA: 102 MMHG (ref 80–100)
PO2 BLDA: 108 MMHG (ref 80–100)
PO2 BLDA: 168 MMHG (ref 80–100)
PO2 BLDA: 96 MMHG (ref 80–100)
POC BE: -5 MMOL/L
POC BE: -5 MMOL/L
POC BE: -6 MMOL/L
POC BE: -7 MMOL/L
POC SATURATED O2: 97 % (ref 95–100)
POC SATURATED O2: 99 % (ref 95–100)
POCT GLUCOSE: 103 MG/DL (ref 70–110)
POCT GLUCOSE: 106 MG/DL (ref 70–110)
POCT GLUCOSE: 106 MG/DL (ref 70–110)
POCT GLUCOSE: 116 MG/DL (ref 70–110)
POCT GLUCOSE: 125 MG/DL (ref 70–110)
POCT GLUCOSE: 133 MG/DL (ref 70–110)
POCT GLUCOSE: 136 MG/DL (ref 70–110)
POCT GLUCOSE: 236 MG/DL (ref 70–110)
POCT GLUCOSE: 292 MG/DL (ref 70–110)
POCT GLUCOSE: 49 MG/DL (ref 70–110)
POCT GLUCOSE: 57 MG/DL (ref 70–110)
POCT GLUCOSE: 63 MG/DL (ref 70–110)
POCT GLUCOSE: 65 MG/DL (ref 70–110)
POCT GLUCOSE: 85 MG/DL (ref 70–110)
POCT GLUCOSE: 86 MG/DL (ref 70–110)
POCT GLUCOSE: 87 MG/DL (ref 70–110)
POCT GLUCOSE: 87 MG/DL (ref 70–110)
POCT GLUCOSE: 88 MG/DL (ref 70–110)
POCT GLUCOSE: 96 MG/DL (ref 70–110)
POCT GLUCOSE: 97 MG/DL (ref 70–110)
POCT GLUCOSE: 98 MG/DL (ref 70–110)
POIKILOCYTOSIS BLD QL SMEAR: ABNORMAL
POIKILOCYTOSIS BLD QL SMEAR: SLIGHT
POIKILOCYTOSIS BLD QL SMEAR: SLIGHT
POLYCHROMASIA BLD QL SMEAR: ABNORMAL
POTASSIUM SERPL-SCNC: 3.7 MMOL/L
POTASSIUM SERPL-SCNC: 3.8 MMOL/L
POTASSIUM SERPL-SCNC: 4.1 MMOL/L
POTASSIUM SERPL-SCNC: 4.3 MMOL/L
POTASSIUM SERPL-SCNC: 4.5 MMOL/L
POTASSIUM SERPL-SCNC: 5.3 MMOL/L
POTASSIUM SERPL-SCNC: 5.7 MMOL/L
POTASSIUM SERPL-SCNC: 5.9 MMOL/L
POTASSIUM SERPL-SCNC: 5.9 MMOL/L
POTASSIUM SERPL-SCNC: 6 MMOL/L
PROT SERPL-MCNC: 5.4 G/DL
PROT SERPL-MCNC: 5.6 G/DL
PROT SERPL-MCNC: 5.7 G/DL
PROT SERPL-MCNC: 5.8 G/DL
PROT SERPL-MCNC: 5.8 G/DL
PROT SERPL-MCNC: 6.2 G/DL
PROT SERPL-MCNC: 6.4 G/DL
PROT SERPL-MCNC: 6.6 G/DL
PROT UR QL STRIP: ABNORMAL
PROTHROMBIN TIME: 11.2 SEC
PROTHROMBIN TIME: 11.4 SEC
PROTHROMBIN TIME: 11.8 SEC
PROTHROMBIN TIME: 12 SEC
PROTHROMBIN TIME: 12.5 SEC
PROTHROMBIN TIME: 12.5 SEC
PROTHROMBIN TIME: 12.7 SEC
PROTHROMBIN TIME: 12.8 SEC
PROTHROMBIN TIME: 13.1 SEC
PROTHROMBIN TIME: 13.3 SEC
PROTHROMBIN TIME: 13.3 SEC
PROTHROMBIN TIME: 13.4 SEC
RBC # BLD AUTO: 2.23 M/UL
RBC # BLD AUTO: 2.44 M/UL
RBC # BLD AUTO: 2.78 M/UL
RBC # BLD AUTO: 2.86 M/UL
RBC # BLD AUTO: 2.89 M/UL
RBC # BLD AUTO: 3.06 M/UL
RBC # BLD AUTO: 3.2 M/UL
RBC # BLD AUTO: 3.32 M/UL
RBC # BLD AUTO: 3.37 M/UL
RBC # BLD AUTO: 3.39 M/UL
RBC # BLD AUTO: 3.44 M/UL
RBC # BLD AUTO: 3.55 M/UL
RBC #/AREA URNS HPF: 15 /HPF (ref 0–4)
RETIRED EF AND QEF - SEE NOTES: 25 (ref 55–65)
SAMPLE: ABNORMAL
SITE: ABNORMAL
SODIUM SERPL-SCNC: 131 MMOL/L
SODIUM SERPL-SCNC: 133 MMOL/L
SODIUM SERPL-SCNC: 135 MMOL/L
SODIUM SERPL-SCNC: 137 MMOL/L
SODIUM SERPL-SCNC: 139 MMOL/L
SODIUM SERPL-SCNC: 139 MMOL/L
SP GR UR STRIP: 1.01 (ref 1–1.03)
SP02: 100
SP02: 100
SP02: 95
SP02: 97
SPHEROCYTES BLD QL SMEAR: ABNORMAL
SQUAMOUS #/AREA URNS HPF: 2 /HPF
TOXIC GRANULES BLD QL SMEAR: PRESENT
TRIGL SERPL-MCNC: 69 MG/DL
TROPONIN I SERPL DL<=0.01 NG/ML-MCNC: 2.33 NG/ML
TROPONIN I SERPL DL<=0.01 NG/ML-MCNC: 5.96 NG/ML
TROPONIN I SERPL DL<=0.01 NG/ML-MCNC: 8.23 NG/ML
URN SPEC COLLECT METH UR: ABNORMAL
UROBILINOGEN UR STRIP-ACNC: NEGATIVE EU/DL
VT: 400
WBC # BLD AUTO: 10.62 K/UL
WBC # BLD AUTO: 10.71 K/UL
WBC # BLD AUTO: 11.64 K/UL
WBC # BLD AUTO: 11.75 K/UL
WBC # BLD AUTO: 12.8 K/UL
WBC # BLD AUTO: 13.14 K/UL
WBC # BLD AUTO: 13.19 K/UL
WBC # BLD AUTO: 15.56 K/UL
WBC # BLD AUTO: 23.85 K/UL
WBC # BLD AUTO: 25.43 K/UL
WBC # BLD AUTO: 8.86 K/UL
WBC # BLD AUTO: 9.15 K/UL
WBC #/AREA URNS HPF: 7 /HPF (ref 0–5)
WBC CLUMPS URNS QL MICRO: ABNORMAL
WBC TOXIC VACUOLES BLD QL SMEAR: PRESENT
YEAST URNS QL MICRO: ABNORMAL

## 2018-01-01 PROCEDURE — 63600175 PHARM REV CODE 636 W HCPCS

## 2018-01-01 PROCEDURE — 27000221 HC OXYGEN, UP TO 24 HOURS

## 2018-01-01 PROCEDURE — 95822 EEG COMA OR SLEEP ONLY: CPT

## 2018-01-01 PROCEDURE — 83520 IMMUNOASSAY QUANT NOS NONAB: CPT

## 2018-01-01 PROCEDURE — 20000000 HC ICU ROOM

## 2018-01-01 PROCEDURE — 25000003 PHARM REV CODE 250: Performed by: STUDENT IN AN ORGANIZED HEALTH CARE EDUCATION/TRAINING PROGRAM

## 2018-01-01 PROCEDURE — 82330 ASSAY OF CALCIUM: CPT | Mod: 91

## 2018-01-01 PROCEDURE — 37799 UNLISTED PX VASCULAR SURGERY: CPT

## 2018-01-01 PROCEDURE — 94770 HC EXHALED C02 TEST: CPT

## 2018-01-01 PROCEDURE — 85007 BL SMEAR W/DIFF WBC COUNT: CPT

## 2018-01-01 PROCEDURE — 99900026 HC AIRWAY MAINTENANCE (STAT)

## 2018-01-01 PROCEDURE — 25000003 PHARM REV CODE 250: Performed by: NURSE PRACTITIONER

## 2018-01-01 PROCEDURE — 85610 PROTHROMBIN TIME: CPT | Mod: 91

## 2018-01-01 PROCEDURE — 85730 THROMBOPLASTIN TIME PARTIAL: CPT | Mod: 91

## 2018-01-01 PROCEDURE — 82550 ASSAY OF CK (CPK): CPT

## 2018-01-01 PROCEDURE — 63600175 PHARM REV CODE 636 W HCPCS: Performed by: STUDENT IN AN ORGANIZED HEALTH CARE EDUCATION/TRAINING PROGRAM

## 2018-01-01 PROCEDURE — 83880 ASSAY OF NATRIURETIC PEPTIDE: CPT | Mod: 91

## 2018-01-01 PROCEDURE — 82330 ASSAY OF CALCIUM: CPT

## 2018-01-01 PROCEDURE — 84100 ASSAY OF PHOSPHORUS: CPT

## 2018-01-01 PROCEDURE — 80053 COMPREHEN METABOLIC PANEL: CPT | Mod: 91

## 2018-01-01 PROCEDURE — 83735 ASSAY OF MAGNESIUM: CPT | Mod: 91

## 2018-01-01 PROCEDURE — 85027 COMPLETE CBC AUTOMATED: CPT

## 2018-01-01 PROCEDURE — 85007 BL SMEAR W/DIFF WBC COUNT: CPT | Mod: 91

## 2018-01-01 PROCEDURE — 83735 ASSAY OF MAGNESIUM: CPT

## 2018-01-01 PROCEDURE — 5A1945Z RESPIRATORY VENTILATION, 24-96 CONSECUTIVE HOURS: ICD-10-PCS | Performed by: INTERNAL MEDICINE

## 2018-01-01 PROCEDURE — 85027 COMPLETE CBC AUTOMATED: CPT | Mod: 91

## 2018-01-01 PROCEDURE — 85730 THROMBOPLASTIN TIME PARTIAL: CPT

## 2018-01-01 PROCEDURE — 99900035 HC TECH TIME PER 15 MIN (STAT)

## 2018-01-01 PROCEDURE — 81000 URINALYSIS NONAUTO W/SCOPE: CPT

## 2018-01-01 PROCEDURE — 63600175 PHARM REV CODE 636 W HCPCS: Performed by: INTERNAL MEDICINE

## 2018-01-01 PROCEDURE — 94003 VENT MGMT INPAT SUBQ DAY: CPT

## 2018-01-01 PROCEDURE — 99233 SBSQ HOSP IP/OBS HIGH 50: CPT | Mod: ,,, | Performed by: INTERNAL MEDICINE

## 2018-01-01 PROCEDURE — 85025 COMPLETE CBC W/AUTO DIFF WBC: CPT | Mod: 91

## 2018-01-01 PROCEDURE — 87040 BLOOD CULTURE FOR BACTERIA: CPT

## 2018-01-01 PROCEDURE — 83605 ASSAY OF LACTIC ACID: CPT

## 2018-01-01 PROCEDURE — 84100 ASSAY OF PHOSPHORUS: CPT | Mod: 91

## 2018-01-01 PROCEDURE — 80053 COMPREHEN METABOLIC PANEL: CPT

## 2018-01-01 PROCEDURE — 83605 ASSAY OF LACTIC ACID: CPT | Mod: 91

## 2018-01-01 PROCEDURE — 86901 BLOOD TYPING SEROLOGIC RH(D): CPT

## 2018-01-01 PROCEDURE — 27100080 HC AIRWAY ADAPTER-END TIDAL CO2

## 2018-01-01 PROCEDURE — 87070 CULTURE OTHR SPECIMN AEROBIC: CPT

## 2018-01-01 PROCEDURE — 94761 N-INVAS EAR/PLS OXIMETRY MLT: CPT

## 2018-01-01 PROCEDURE — 99223 1ST HOSP IP/OBS HIGH 75: CPT | Mod: ,,, | Performed by: NURSE PRACTITIONER

## 2018-01-01 PROCEDURE — 85610 PROTHROMBIN TIME: CPT

## 2018-01-01 PROCEDURE — 02HV33Z INSERTION OF INFUSION DEVICE INTO SUPERIOR VENA CAVA, PERCUTANEOUS APPROACH: ICD-10-PCS | Performed by: INTERNAL MEDICINE

## 2018-01-01 PROCEDURE — 97802 MEDICAL NUTRITION INDIV IN: CPT

## 2018-01-01 PROCEDURE — 95816 EEG AWAKE AND DROWSY: CPT | Mod: 26,,, | Performed by: PSYCHIATRY & NEUROLOGY

## 2018-01-01 PROCEDURE — 27200966 HC CLOSED SUCTION SYSTEM

## 2018-01-01 PROCEDURE — 80061 LIPID PANEL: CPT

## 2018-01-01 PROCEDURE — 94002 VENT MGMT INPAT INIT DAY: CPT

## 2018-01-01 PROCEDURE — 36415 COLL VENOUS BLD VENIPUNCTURE: CPT

## 2018-01-01 PROCEDURE — 84484 ASSAY OF TROPONIN QUANT: CPT | Mod: 91

## 2018-01-01 PROCEDURE — 82803 BLOOD GASES ANY COMBINATION: CPT

## 2018-01-01 PROCEDURE — 99291 CRITICAL CARE FIRST HOUR: CPT | Mod: ,,, | Performed by: INTERNAL MEDICINE

## 2018-01-01 PROCEDURE — 82947 ASSAY GLUCOSE BLOOD QUANT: CPT

## 2018-01-01 PROCEDURE — 25000003 PHARM REV CODE 250

## 2018-01-01 PROCEDURE — 87205 SMEAR GRAM STAIN: CPT

## 2018-01-01 PROCEDURE — 93306 TTE W/DOPPLER COMPLETE: CPT

## 2018-01-01 PROCEDURE — 84484 ASSAY OF TROPONIN QUANT: CPT

## 2018-01-01 PROCEDURE — 87086 URINE CULTURE/COLONY COUNT: CPT

## 2018-01-01 RX ORDER — IBUPROFEN 200 MG
24 TABLET ORAL
Status: DISCONTINUED | OUTPATIENT
Start: 2018-01-01 | End: 2018-03-09 | Stop reason: HOSPADM

## 2018-01-01 RX ORDER — POTASSIUM CHLORIDE 14.9 MG/ML
20 INJECTION INTRAVENOUS
Status: DISCONTINUED | OUTPATIENT
Start: 2018-01-01 | End: 2018-03-09 | Stop reason: HOSPADM

## 2018-01-01 RX ORDER — PHENYLEPHRINE HYDROCHLORIDE 10 MG/ML
INJECTION INTRAVENOUS
Status: COMPLETED
Start: 2018-01-01 | End: 2018-01-01

## 2018-01-01 RX ORDER — POTASSIUM CHLORIDE 14.9 MG/ML
40 INJECTION INTRAVENOUS
Status: DISCONTINUED | OUTPATIENT
Start: 2018-01-01 | End: 2018-03-09 | Stop reason: HOSPADM

## 2018-01-01 RX ORDER — PHENYLEPHRINE HYDROCHLORIDE 10 MG/ML
200 INJECTION INTRAVENOUS ONCE
Status: COMPLETED | OUTPATIENT
Start: 2018-01-01 | End: 2018-01-01

## 2018-01-01 RX ORDER — ACETAMINOPHEN 325 MG/1
650 TABLET ORAL EVERY 4 HOURS PRN
Status: DISCONTINUED | OUTPATIENT
Start: 2018-01-01 | End: 2018-03-09 | Stop reason: HOSPADM

## 2018-01-01 RX ORDER — GLYCOPYRROLATE 0.2 MG/ML
0.1 INJECTION INTRAMUSCULAR; INTRAVENOUS ONCE
Status: COMPLETED | OUTPATIENT
Start: 2018-01-01 | End: 2018-01-01

## 2018-01-01 RX ORDER — IPRATROPIUM BROMIDE AND ALBUTEROL SULFATE 2.5; .5 MG/3ML; MG/3ML
3 SOLUTION RESPIRATORY (INHALATION) EVERY 4 HOURS PRN
Status: DISCONTINUED | OUTPATIENT
Start: 2018-01-01 | End: 2018-03-09 | Stop reason: HOSPADM

## 2018-01-01 RX ORDER — HEPARIN SODIUM 5000 [USP'U]/ML
5000 INJECTION, SOLUTION INTRAVENOUS; SUBCUTANEOUS EVERY 8 HOURS
Status: DISCONTINUED | OUTPATIENT
Start: 2018-01-01 | End: 2018-03-09 | Stop reason: HOSPADM

## 2018-01-01 RX ORDER — NOREPINEPHRINE BITARTRATE 1 MG/ML
INJECTION, SOLUTION INTRAVENOUS
Status: COMPLETED
Start: 2018-01-01 | End: 2018-01-01

## 2018-01-01 RX ORDER — ONDANSETRON 2 MG/ML
4 INJECTION INTRAMUSCULAR; INTRAVENOUS EVERY 8 HOURS PRN
Status: DISCONTINUED | OUTPATIENT
Start: 2018-01-01 | End: 2018-03-09 | Stop reason: HOSPADM

## 2018-01-01 RX ORDER — INSULIN ASPART 100 [IU]/ML
0-5 INJECTION, SOLUTION INTRAVENOUS; SUBCUTANEOUS EVERY 6 HOURS PRN
Status: DISCONTINUED | OUTPATIENT
Start: 2018-01-01 | End: 2018-03-09 | Stop reason: HOSPADM

## 2018-01-01 RX ORDER — DEXTROSE MONOHYDRATE AND SODIUM CHLORIDE 5; .9 G/100ML; G/100ML
INJECTION, SOLUTION INTRAVENOUS CONTINUOUS
Status: DISCONTINUED | OUTPATIENT
Start: 2018-01-01 | End: 2018-01-01

## 2018-01-01 RX ORDER — MAGNESIUM SULFATE HEPTAHYDRATE 40 MG/ML
2 INJECTION, SOLUTION INTRAVENOUS
Status: DISCONTINUED | OUTPATIENT
Start: 2018-01-01 | End: 2018-03-09 | Stop reason: HOSPADM

## 2018-01-01 RX ORDER — PHENYLEPHRINE HYDROCHLORIDE 10 MG/ML
100 INJECTION INTRAVENOUS ONCE
Status: COMPLETED | OUTPATIENT
Start: 2018-01-01 | End: 2018-01-01

## 2018-01-01 RX ORDER — PANTOPRAZOLE SODIUM 40 MG/10ML
40 INJECTION, POWDER, LYOPHILIZED, FOR SOLUTION INTRAVENOUS DAILY
Status: DISCONTINUED | OUTPATIENT
Start: 2018-01-01 | End: 2018-01-01

## 2018-01-01 RX ORDER — SODIUM CHLORIDE 0.9 % (FLUSH) 0.9 %
5 SYRINGE (ML) INJECTION
Status: DISCONTINUED | OUTPATIENT
Start: 2018-01-01 | End: 2018-03-09 | Stop reason: HOSPADM

## 2018-01-01 RX ORDER — HEPARIN SODIUM 5000 [USP'U]/ML
5000 INJECTION, SOLUTION INTRAVENOUS; SUBCUTANEOUS EVERY 8 HOURS
Status: DISCONTINUED | OUTPATIENT
Start: 2018-01-01 | End: 2018-01-01

## 2018-01-01 RX ORDER — GLUCAGON 1 MG
1 KIT INJECTION
Status: DISCONTINUED | OUTPATIENT
Start: 2018-01-01 | End: 2018-03-09 | Stop reason: HOSPADM

## 2018-01-01 RX ORDER — LORAZEPAM 2 MG/ML
INJECTION INTRAMUSCULAR
Status: DISPENSED
Start: 2018-01-01 | End: 2018-03-09

## 2018-01-01 RX ORDER — IBUPROFEN 200 MG
16 TABLET ORAL
Status: DISCONTINUED | OUTPATIENT
Start: 2018-01-01 | End: 2018-03-09 | Stop reason: HOSPADM

## 2018-01-01 RX ADMIN — HEPARIN SODIUM 5000 UNITS: 5000 INJECTION, SOLUTION INTRAVENOUS; SUBCUTANEOUS at 02:03

## 2018-01-01 RX ADMIN — DEXTROSE MONOHYDRATE 1.5 MCG/KG/MIN: 5 INJECTION, SOLUTION INTRAVENOUS at 12:03

## 2018-01-01 RX ADMIN — PIPERACILLIN AND TAZOBACTAM 4.5 G: 4; .5 INJECTION, POWDER, LYOPHILIZED, FOR SOLUTION INTRAVENOUS; PARENTERAL at 05:03

## 2018-01-01 RX ADMIN — PHENYLEPHRINE HYDROCHLORIDE 200 MCG: 10 INJECTION INTRAVENOUS at 07:03

## 2018-01-01 RX ADMIN — SODIUM CHLORIDE 500 ML: 0.9 INJECTION, SOLUTION INTRAVENOUS at 02:03

## 2018-01-01 RX ADMIN — HEPARIN SODIUM 5000 UNITS: 5000 INJECTION, SOLUTION INTRAVENOUS; SUBCUTANEOUS at 09:03

## 2018-01-01 RX ADMIN — VANCOMYCIN HYDROCHLORIDE 750 MG: 750 INJECTION, POWDER, LYOPHILIZED, FOR SOLUTION INTRAVENOUS at 10:03

## 2018-01-01 RX ADMIN — DEXTROSE MONOHYDRATE 12.5 G: 500 INJECTION PARENTERAL at 08:03

## 2018-01-01 RX ADMIN — MAGNESIUM SULFATE IN WATER 2 G: 40 INJECTION, SOLUTION INTRAVENOUS at 08:03

## 2018-01-01 RX ADMIN — DEXTROSE MONOHYDRATE 12.5 G: 500 INJECTION PARENTERAL at 06:03

## 2018-01-01 RX ADMIN — CALCIUM GLUCONATE 1 G: 94 INJECTION, SOLUTION INTRAVENOUS at 07:03

## 2018-01-01 RX ADMIN — DEXTROSE MONOHYDRATE 1 MCG/KG/MIN: 5 INJECTION, SOLUTION INTRAVENOUS at 02:03

## 2018-01-01 RX ADMIN — GLYCOPYRROLATE 0.1 MG: 0.2 INJECTION INTRAMUSCULAR; INTRAVENOUS at 08:03

## 2018-01-01 RX ADMIN — LORAZEPAM 1 MG: 2 INJECTION INTRAMUSCULAR; INTRAVENOUS at 08:03

## 2018-01-01 RX ADMIN — HEPARIN SODIUM 5000 UNITS: 5000 INJECTION, SOLUTION INTRAVENOUS; SUBCUTANEOUS at 08:03

## 2018-01-01 RX ADMIN — NOREPINEPHRINE BITARTRATE 8000 MCG: 1 INJECTION, SOLUTION, CONCENTRATE INTRAVENOUS at 07:03

## 2018-01-01 RX ADMIN — DEXTROSE MONOHYDRATE 1.3 MCG/KG/MIN: 5 INJECTION, SOLUTION INTRAVENOUS at 10:03

## 2018-01-01 RX ADMIN — HEPARIN SODIUM 5000 UNITS: 5000 INJECTION, SOLUTION INTRAVENOUS; SUBCUTANEOUS at 05:03

## 2018-01-01 RX ADMIN — PIPERACILLIN SODIUM AND TAZOBACTAM SODIUM 4.5 G: 4; .5 INJECTION, POWDER, LYOPHILIZED, FOR SOLUTION INTRAVENOUS at 05:03

## 2018-01-01 RX ADMIN — HEPARIN SODIUM 5000 UNITS: 5000 INJECTION, SOLUTION INTRAVENOUS; SUBCUTANEOUS at 10:03

## 2018-01-01 RX ADMIN — PHENYLEPHRINE HYDROCHLORIDE 100 MCG: 10 INJECTION INTRAVENOUS at 07:03

## 2018-01-01 RX ADMIN — HEPARIN SODIUM 5000 UNITS: 5000 INJECTION, SOLUTION INTRAVENOUS; SUBCUTANEOUS at 03:03

## 2018-01-01 RX ADMIN — SODIUM CHLORIDE 1000 ML: 0.9 INJECTION, SOLUTION INTRAVENOUS at 06:03

## 2018-01-01 RX ADMIN — INSULIN ASPART 1 UNITS: 100 INJECTION, SOLUTION INTRAVENOUS; SUBCUTANEOUS at 12:03

## 2018-01-01 RX ADMIN — PIPERACILLIN SODIUM AND TAZOBACTAM SODIUM 4.5 G: 4; .5 INJECTION, POWDER, LYOPHILIZED, FOR SOLUTION INTRAVENOUS at 06:03

## 2018-01-01 RX ADMIN — PIPERACILLIN SODIUM AND TAZOBACTAM SODIUM 4.5 G: 4; .5 INJECTION, POWDER, LYOPHILIZED, FOR SOLUTION INTRAVENOUS at 10:03

## 2018-01-01 RX ADMIN — DEXTROSE MONOHYDRATE 1.3 MCG/KG/MIN: 5 INJECTION, SOLUTION INTRAVENOUS at 09:03

## 2018-01-01 RX ADMIN — PIPERACILLIN SODIUM AND TAZOBACTAM SODIUM 4.5 G: 4; .5 INJECTION, POWDER, LYOPHILIZED, FOR SOLUTION INTRAVENOUS at 03:03

## 2018-01-01 RX ADMIN — CALCIUM GLUCONATE 1 G: 94 INJECTION, SOLUTION INTRAVENOUS at 01:03

## 2018-01-01 RX ADMIN — PIPERACILLIN SODIUM AND TAZOBACTAM SODIUM 4.5 G: 4; .5 INJECTION, POWDER, LYOPHILIZED, FOR SOLUTION INTRAVENOUS at 11:03

## 2018-01-01 RX ADMIN — VANCOMYCIN HYDROCHLORIDE 750 MG: 750 INJECTION, POWDER, LYOPHILIZED, FOR SOLUTION INTRAVENOUS at 09:03

## 2018-01-01 RX ADMIN — DEXTROSE MONOHYDRATE 12.5 G: 500 INJECTION PARENTERAL at 03:03

## 2018-01-01 RX ADMIN — MORPHINE SULFATE 2 MG/HR: 10 INJECTION INTRAMUSCULAR; INTRAVENOUS; SUBCUTANEOUS at 07:03

## 2018-01-01 RX ADMIN — DEXTROSE MONOHYDRATE 0.3 MCG/KG/MIN: 5 INJECTION, SOLUTION INTRAVENOUS at 05:03

## 2018-03-05 PROBLEM — I46.9 CARDIAC ARREST: Status: ACTIVE | Noted: 2018-01-01

## 2018-03-05 PROBLEM — J96.02 ACUTE HYPERCAPNIC RESPIRATORY FAILURE: Status: ACTIVE | Noted: 2018-01-01

## 2018-03-05 PROBLEM — F19.90 IVDU (INTRAVENOUS DRUG USER): Status: ACTIVE | Noted: 2018-01-01

## 2018-03-05 NOTE — PLAN OF CARE
Problem: Patient Care Overview  Goal: Plan of Care Review  Outcome: Ongoing (interventions implemented as appropriate)  1735: patient arrived to ICU intubated on portable vent with EMS. Placed on . EMS reports RR 20 / Vt 440 / FiO2 50% during transport. BBS fine crackles, equal. #7 ETT, secured at 24cm at lip. ETT patent. No mucous production upon suctioning.   1742:  pulm CC at bedside,  physician directive set Vt 6cc per IBW. Pt 65 inches per patient nurse.    1805: art line insertion by Dr. Mensah in progress.  1850: Vt increased to 400ml by Dr. GIO Mensah. Vent orders updated to reflect change.   1917: arrythmia noted, code cart and Dr. GIO Mensah at bedside.

## 2018-03-06 PROBLEM — N17.9 AKI (ACUTE KIDNEY INJURY): Status: ACTIVE | Noted: 2018-01-01

## 2018-03-06 PROBLEM — T07.XXXA WOUNDS, MULTIPLE: Status: ACTIVE | Noted: 2018-01-01

## 2018-03-06 PROBLEM — D72.829 LEUKOCYTOSIS: Status: ACTIVE | Noted: 2018-01-01

## 2018-03-06 PROBLEM — E44.0 MODERATE MALNUTRITION: Status: ACTIVE | Noted: 2018-01-01

## 2018-03-06 NOTE — HPI
43-year-old female presents and cardiac and respiratory arrest.  Family reports patient was using heroin when earlier today and was later found by  staring blankly into space and not breathing or moving.  Family called 911 and patient was in asystole on arrival.  ACLS protocol was initiated, Prashant was placed, and patient was intubated prior to arrival to the hospital.  At arrival patient had a palpable pulse.  Several minutes later patient went into PA and CPR was again initiated.  Patient was placed on a transcutaneous pacemaker and pulse was reobtained.    Patient was transferred to [facility] for Critical Care services.  Upon arrival, an arterial line was placed via CC. Patient was initiated on Levophed due to MAP being below 65.

## 2018-03-06 NOTE — PROGRESS NOTES
[facility]UL notified of pt. [facility]UL rep stated that patient is not a candidate for organ donation due to cancer history. Will notify [facility]UL at time of cardiac death per representative directions.

## 2018-03-06 NOTE — CONSULTS
Pulmonary/Critical Care Medicine: Initial Consultation    HPI:     44 y/o female with a 30 pack year history of tobacco (actively smoking), IVDU (heroin, actively injecting), and Vulvar Cancer (diagnosed via biopsy at [facility], actively getting chemo/radiation) who was transferred from an OSH/ED after a cardiac arrest at home.  Pulmonary/Critical Care consulted for post-arrest management.    History obtained from EMS, chart review, and family in the waiting room (patient's mother, four children, and boyfriend).  Per family, patient lives with boyfriend and her mother.  She is actively injecting morphine and using morphine for pain relief.  This morning she was seen in her usual state by her boyfriend (used heroin this morning and last night), however he came back to check on her 20-30 minutes later and she was found down on the floor.  He subsequently called EMS and started compressions.  Per EMS, she was in V-Fib and shocked 3 times/given Narcan with ROSC achieved after about 10 min.  She was subsequently brought to an OSH/ED where she arrested again-->PEA/Asytole with ROSC achieved after another 10 min.  The patient was then transferred to [facility] for post-arrest management.  At the time of her transfer, she was intubated (not in shock) with absent pupil/corneal/gag relfexes and jerking motions of her upper extremities.      [facility] ICU:     Right Femoral Arterial line and RIJ CVC lines placed (as patient became hypotensive requiring vasopressors).    Bedside Ultrasound: no evidence of pericardial effussion/tamponade, appropriate cardiac contractility, no evidence of RV strain, difficult to visual IVC, no pleural fluid or abdominal fluid.  Two point lower extremity US for DVT negative bilaterally.    Past Medical History:   Diagnosis Date    Condyloma      Past Surgical History:   Procedure Laterality Date     SECTION      4     Social History:   Social History     Social History     Marital status: Single     Spouse name: N/A    Number of children: N/A    Years of education: N/A     Occupational History    Not on file.     Social History Main Topics    Smoking status: Current Every Day Smoker    Smokeless tobacco: Never Used    Alcohol use No    Drug use: No    Sexual activity: No     Other Topics Concern    Not on file     Social History Narrative    No narrative on file     Family History   Problem Relation Age of Onset    Throat cancer Maternal Grandmother     Breast cancer Mother      Current Infusions:   norepinephrine bitartrate-D5W       Scheduled Medications:     norepinephrine        piperacillin-tazobactam (ZOSYN) IVPB  4.5 g Intravenous Q8H    vancomycin (VANCOCIN) IVPB  15 mg/kg Intravenous Q12H     PRN Medications:   acetaminophen, albuterol-ipratropium 2.5mg-0.5mg/3mL, calcium gluconate IVPB, calcium gluconate IVPB, calcium gluconate IVPB, dextrose 50%, dextrose 50%, glucagon (human recombinant), glucose, glucose, magnesium sulfate IVPB, ondansetron, potassium chloride **AND** potassium chloride **AND** potassium chloride, sodium chloride 0.9%    Review of Systems:   A comprehensive 12-point review of systems was performed, and is negative except for those items mentioned above in the HPI section of this note.     Vital Signs:    Vitals:    03/05/18 2121   BP:    Pulse: (!) 122   Resp: (!) 25   Temp: (!) 92.8 °F (33.8 °C)       Fluid Balance:     Intake/Output Summary (Last 24 hours) at 03/05/18 2146  Last data filed at 03/05/18 1845   Gross per 24 hour   Intake             1000 ml   Output                0 ml   Net             1000 ml       Physical Exam:     General/Neuro: absent pupil/corneal/gag reflexes with myoclonic jerks in upper extremities (completely off sedation/analgesia)  HEENT: AT/NC, PERRL, EOMI, nasal and oral mucosa moist. Normal dentition. Oropharynx without exudate.   Neck: Supple without JVD. Trachea midline. Thyroid feels normal.   Cardiac:  RRR with no MRG with brisk cap refill and symmetric pulses in distal extremities.  Respiratory: decreased breath sounds bilaterally   Abdomen: Soft, NT/ND. +BS.  Extremities/skin: evidence of track marks    Personal Review and Summary of Prior Diagnostics    Laboratory Studies:     Recent Labs  Lab 03/05/18  1943   PH 7.242*   PCO2 47.9*   PO2 102*   HCO3 20.6*   POCSATURATED 97   BE -7       Recent Labs  Lab 03/05/18  1244  03/05/18  1417   WBC 7.80  --   --    RBC 2.29*  --   --    HGB 7.0*  --   --    HCT 21.8*  < > 24*     --   --    MCV 96  --   --    MCH 30.6  --   --    MCHC 32.0  --   --    < > = values in this interval not displayed.    Recent Labs  Lab 03/05/18  1244      K 4.4      CO2 19*   BUN 10   CREATININE 1.0       Microbiology Data:   Microbiology Results (last 7 days)     Procedure Component Value Units Date/Time    Culture, Respiratory with Gram Stain [[id number]]     Order Status:  No result Specimen:  Respiratory from Tracheal Aspirate     Urine culture [[id number]]     Order Status:  No result Specimen:  Urine     Culture, Respiratory with Gram Stain [[id number]]     Order Status:  Canceled Specimen:  Respiratory from Sputum     Blood culture [[id number]]     Order Status:  No result Specimen:  Blood     Blood culture [[id number]]     Order Status:  No result Specimen:  Blood         Summary of Chest Imaging Personally Reviewed: Chest X-Ray post intubation/post RIJ CVC: ETT 2 cm above radha and CVC in appropriate place, patchy opacitiy in RLL base    Assessment/Plan    44 y/o female with a 30 pack year history of tobacco (actively smoking), IVDU (heroin, actively injecting), and Vulvar Cancer (diagnosed via biopsy at [facility], actively getting chemo/radiation) who was transferred from an OSH/ED after a cardiac arrest at home 3/5/18.      Last seen by boyfriend 30 min prior to seeing her on the floor (actively using heroin)-->Per EMS, she was in V-Fib and shocked 3 times/given  "Narcan with ROSC achieved after about 10 min.  She was subsequently brought to an OSH/ED where she arrested again-->PEA/Asytole with ROSC achieved after another 10 min.  The patient was then transferred to [facility] for post-arrest management.  At the time of her transfer, she was intubated (not in shock) with absent pupil/corneal/gag relfexes and jerking motions of her upper extremities.        On arrival to the ICU at [facility] [place], Right Femoral Arterial Line/RIJ CVC lines placed (as patient became hypotensive requiring vasopressors)-->Bedside Ultrasound: no evidence of pericardial effussion/tamponade, appropriate cardiac contractility, no evidence of RV strain, difficult to visual IVC, no pleural fluid or abdominal fluid.  Two point lower extremity US for DVT negative bilaterally.    -->Pulmonary/Critical Care consulted for post-arrest management.    V-Fib Cardiac Arrest in the field (ROSC ~10 minutes) + Subsequent PEA/Asystole Arrest at OSH/ED (ROSC ~10 minutes)    -most likely secondary to drugs/toxins (actively using heroin, morphine, responded to narcan)    -Hypothermia protocol initiated (targeting temp of 36 degrees Celsius for 24 hrs), Troponin/EKG/ECHO/CT Head/EEG ordered    -Will cover with Vanc/Zosyn and obtain pan cultures and ECHO (given IVDU, de-escalate based on cultures)    -Received 2.5L NS and now on norepinephrine gtt targeting a MAP>65    -Frequent blood draws q6hrs: ABG, CMP, CBC, INR, Lactic Acid Mg, Phos (given hypothermia protocol and risk for infection, coagulapathy, "cold diuresis")    -Too early to prognosticate s/p arrest; however poor signs thus far include absent pupil/corneal/gag relfexes and jerking motions of her upper extremities.  Will continue to assess neuro exam off sedation/analgesia (waiting on CT Head, EEG)    Acute Hypoxemic Hypercanpic Respiratory Failure (intubated at OSH) 2/2 Hypoventilation (in the setting of morphine/heroin) leading to cardiac arrest    -Currently on " ~6 cc kg/PBW, Plateau Pressure <30, breathing at the vent (off sedation and analgesia, RASS-5)    -Avoiding sedation and analgesia for accurate neuro evaluation.  In the event, her myoclonic jerks become more frequent and distressing to the family will consider propofol.    Shock: Distributive (Sepsis) vs. Post Arrest Shock.  Low suspicion for hemmorhagic shock or obstructive shock from tamponade, pneumothorax, or VTE disease based on bedside ultrasound and chest x-ray    -Received 2.5L of NS, now on norepinephrine gtt targetting MAP >65.  Will transfuse for hemoglobin <7    -Will follow up EKG/Troponin/ECHO    DVT Prophylaxis: SCDs for now, pending CT Head and reported vaginal bleeding    GI Prophylaxis: pantoprazole (while intubated, not home medication)    Goals of Care/Code Status: Extensive discussion held with the family (patient's mother, four children, and boyfriend) in the waiting room (along with the patient's nurse, Miladis).  I relayed the patient's hospital course/interval events throughout the day, with an emphasis on her critical state.  At this time, in the event of another cardiac arrest, the family believes the patient would want to be resuscitated.  I deferred questions related to prognostication, as she is in the first 24 hrs s/p cardiac arrest.  Full Code    Hermann Mensah MD, MSc  Pulmonary/Critical Care Fellow

## 2018-03-06 NOTE — ASSESSMENT & PLAN NOTE
Malnutrition in the context of Chronic Illness/Injury    Related to (etiology):  Decreased intake in the setting of vulvar cancer on chemo, now s/p cardiac arrest and NPO without nutrition support    Signs and Symptoms (as evidenced by):  8% wt loss x 7 months, overall moderate muscle/fat depletion    Interventions/Recommendations (treatment strategy):  If medically feasible and in line with goals of care, recommend to initiate enteral nutrition while intubated; See RD note 3/6/2018 for full recs     Nutrition Diagnosis Status:  New

## 2018-03-06 NOTE — PLAN OF CARE
Problem: Patient Care Overview  Goal: Plan of Care Review  Outcome: Ongoing (interventions implemented as appropriate)  Recommendation/Intervention:   1. If medically feasible and in line with goals of care, recommend to initiate enteral nutrition while intubated. Recommend Nutren 1.5 @ goal rate of 30ml/hr. Initiate @ 10ml/hr, increase by 10ml q8h until goal. Provides 1167 kcal (108% EEN), 49 gm protein (91% EPN), and 547ml free water. Additional fluids per team.   2. If pt is extubated, advance diet as tolerated to regular, texture per SLP     Goals:   1. Meet >85% EEN and EPN   2. Prevent wt loss   3. Promote nutrition related labs wnl

## 2018-03-06 NOTE — PLAN OF CARE
03/06/18 1441   Discharge Assessment   Assessment Type Discharge Planning Assessment   Prior to hospitilization cognitive status: Alert/Oriented   Prior to hospitalization functional status: Independent   Current cognitive status: Coma/Sedated/Intubated  (on mechanical vent)   Current Functional Status: Completely Dependent   Patient currently being followed by outpatient case management? Unable to determine (comments)   Do you have any problems affording any of your prescribed medications? TBD   Patient/Family In Agreement With Plan unable to assess  (RAUL also consulted)

## 2018-03-06 NOTE — CONSULTS
[facility]  Adult Nutrition  Consult Note    SUMMARY     Recommendations    Recommendation/Intervention:   1. If medically feasible and in line with goals of care, recommend to initiate enteral nutrition while intubated. Recommend Nutren 1.5 @ goal rate of 30ml/hr. Initiate @ 10ml/hr, increase by 10ml q8h until goal. Provides 1167 kcal (108% EEN), 49 gm protein (91% EPN), and 547ml free water. Additional fluids per team.   2. If pt is extubated, advance diet as tolerated to regular, texture per SLP    Goals:   1. Meet >85% EEN and EPN   2. Prevent wt loss   3. Promote nutrition related labs wnl    Nutrition Goal Status: new  Communication of RD Recs: discussed on rounds    Reason for Assessment    Reason for Assessment: physician consult  Diagnosis:   1. Cardiac arrest    2. Decreased alertness      Past Medical History:   Diagnosis Date    Condyloma         Interdisciplinary Rounds: attended     General Information Comments: Pt is intubated s/p cardiac arrest. Noted with vulvar cancer, currently on chemotherapy. Per chart, pt has had -8% wt loss x 7 months. Pt has overall moderate muscle/fat depletion.     Nutrition Discharge Planning: pending medical course    Nutrition Prescription Ordered    Current Diet Order: NPO    Evaluation of Received Nutrients/Fluid Intake    % Intake of Estimated Energy Needs: 0 - 25 %  % Meal Intake: NPO     Nutrition/Diet History    Food Preferences: DAVION cultural/spiritual prefs  Factors Affecting Nutritional Intake: NPO, on mechanical ventilation    Labs/Tests/Procedures/Meds    Pertinent Labs Reviewed: reviewed  Lab Results   Component Value Date     03/06/2018    K 4.1 03/06/2018     03/06/2018    CO2 20 (L) 03/06/2018    BUN 20 03/06/2018    CREATININE 1.7 (H) 03/06/2018    CALCIUM 7.9 (L) 03/06/2018    PHOS 4.0 03/06/2018    MG 1.4 (L) 03/06/2018    ESTGFRAFRICA 41 (A) 03/06/2018    EGFRNONAA 36 (A) 03/06/2018    ALBUMIN 2.1 (L) 03/06/2018  "  *corrected Ca 9.42    Lab Results   Component Value Date    ALT 56 (H) 03/06/2018     (H) 03/06/2018    ALKPHOS 87 03/06/2018     POCT Glucose   Date Value Ref Range Status   03/06/2018 292 (H) 70 - 110 mg/dL Final   03/05/2018 236 (H) 70 - 110 mg/dL Final     Pertinent Medications Reviewed: reviewed  Scheduled Meds:   heparin (porcine)  5,000 Units Subcutaneous Q8H    piperacillin-tazobactam (ZOSYN) IVPB  4.5 g Intravenous Q8H    vancomycin (VANCOCIN) IVPB  15 mg/kg Intravenous Q12H     Continuous Infusions:   norepinephrine bitartrate-D5W 1.3 mcg/kg/min (03/06/18 0658)     Physical Findings    Overall Physical Appearance: loss of muscle mass, loss of subcutaneous fat, on ventilator support  Tubes: orogastric tube  Oral/Mouth Cavity: tooth/teeth missing  Skin: intact    Anthropometrics    Temp: (!) 93.2 °F (34 °C)  Height: 5' 5" (165.1 cm)  Weight Method: Bed Scale  Weight: 45 kg (99 lb 3.3 oz)  Ideal Body Weight (IBW), Female: 125 lb  % Ideal Body Weight, Female (lb): 79.37 lb  BMI (Calculated): 16.5  BMI Grade: 16 - 16.9 protein-energy malnutrition grade II     Estimated/Assessed Needs    Weight Used For Calorie Calculations: 45 kg (99 lb 3.3 oz)   Energy Calorie Requirements (kcal): 1082  Energy Need Method: [facility]    Weight Used For Protein Calculations: 45 kg (99 lb 3.3 oz)  Protein Requirements: 54-68 gm/d (1.2-1.5 gm/kg)    Fluid Requirements (mL): 1082 (or per team)  Fluid Need Method: RDA Method    Assessment and Plan    Moderate malnutrition    Malnutrition in the context of Chronic Illness/Injury    Related to (etiology):  Decreased intake in the setting of vulvar cancer on chemo, now s/p cardiac arrest and NPO without nutrition support    Signs and Symptoms (as evidenced by):  8% wt loss x 7 months, overall moderate muscle/fat depletion    Interventions/Recommendations (treatment strategy):  If medically feasible and in line with goals of care, recommend to initiate enteral nutrition " while intubated; See RD note 3/6/2018 for full recs     Nutrition Diagnosis Status:  New            Monitor and Evaluation    Food and Nutrient Intake: energy intake, food and beverage intake, enteral nutrition intake  Food and Nutrient Adminstration: diet order, enteral and parenteral nutrition administration  Anthropometric Measurements: weight, weight change  Biochemical Data, Medical Tests and Procedures: electrolyte and renal panel, gastrointestinal profile, glucose/endocrine profile, inflammatory profile  Nutrition-Focused Physical Findings: overall appearance, extremities, muscles and bones, skin    Nutrition Risk    Level of Risk: other (see comments) (f/u 2x/wk)    Nutrition Follow-Up    RD Follow-up?: Yes    Alycia العلي, MS, RD, LDN   Dietitian, [facility], [place], operated by [facility]  [phone]

## 2018-03-06 NOTE — H&P
[facility] Hospital Medicine  History & Physical    Patient Name: Sarahi Marquez  MRN: [id number]  Admission Date: 3/5/2018  Attending Physician: Oh Morales MD   Primary Care Provider: Primary Doctor No         Patient information was obtained from EMS personnel, past medical records and ER records.     Subjective:     Principal Problem:Cardiac arrest    Chief Complaint: No chief complaint on file.       HPI: 43-year-old female presents and cardiac and respiratory arrest.  Family reports patient was using heroin when earlier today and was later found by  staring blankly into space and not breathing or moving.  Family called 911 and patient was in asystole on arrival.  ACLS protocol was initiated, Prashant was placed, and patient was intubated prior to arrival to the hospital.  At arrival patient had a palpable pulse.  Several minutes later patient went into PA and CPR was again initiated.  Patient was placed on a transcutaneous pacemaker and pulse was reobtained.    Patient was transferred to [facility] for Critical Care services.  Upon arrival, an arterial line was placed via CC. Patient was initiated on Levophed due to MAP being below 65.         Past Medical History:   Diagnosis Date    Condyloma        Past Surgical History:   Procedure Laterality Date     SECTION      4       Review of patient's allergies indicates:  No Known Allergies    Current Facility-Administered Medications on File Prior to Encounter   Medication    [COMPLETED] 0.9%  NaCl infusion    [COMPLETED] 0.9%  NaCl infusion    [COMPLETED] EPINEPHrine 0.1 mg/mL injection    [COMPLETED] sodium bicarbonate 8.4 % (1 mEq/mL) injection    [COMPLETED] sodium chloride 0.9% bolus 500 mL     Current Outpatient Prescriptions on File Prior to Encounter   Medication Sig    albuterol (VENTOLIN HFA) 90 mcg/actuation inhaler Inhale 2 puffs into the lungs every 6 (six) hours as needed for Wheezing. Rescue    dexamethasone  (DECADRON) 0.5 MG tablet Take 0.5 mg by mouth 2 (two) times daily with meals.    docusate sodium (COLACE) 100 MG capsule Take 100 mg by mouth 2 (two) times daily.    morphine (MS CONTIN) 15 MG 12 hr tablet Take 15 mg by mouth 2 (two) times daily.    ondansetron (ZOFRAN) 4 MG tablet Take 8 mg by mouth 2 (two) times daily.    polyethylene glycol (GLYCOLAX) 17 gram/dose powder Take 17 g by mouth once daily.    promethazine (PHENERGAN) 25 MG tablet Take 25 mg by mouth every 4 (four) hours.    [DISCONTINUED] lidocaine HCL 2% (XYLOCAINE) 2 % jelly Apply to affected area daily prn    [DISCONTINUED] oxycodone-acetaminophen (PERCOCET) 5-325 mg per tablet Take 1 tablet by mouth every 4 (four) hours as needed for Pain.     Family History     Problem Relation (Age of Onset)    Breast cancer Mother    Throat cancer Maternal Grandmother        Social History Main Topics    Smoking status: Current Every Day Smoker    Smokeless tobacco: Never Used    Alcohol use No    Drug use: No    Sexual activity: No     Review of Systems   Unable to perform ROS: Intubated     Objective:     Vital Signs (Most Recent):  Temp: (!) 92.8 °F (33.8 °C) (03/05/18 1952)  Pulse: (!) 116 (03/05/18 1952)  Resp: 19 (03/05/18 1952)  BP: (!) 108/58 (03/05/18 1917)  SpO2: 100 % (03/05/18 1952) Vital Signs (24h Range):  Temp:  [92.8 °F (33.8 °C)-95 °F (35 °C)] 92.8 °F (33.8 °C)  Pulse:  [] 116  Resp:  [11-25] 19  SpO2:  [96 %-100 %] 100 %  BP: ()/() 108/58     Weight: 45 kg (99 lb 3.3 oz)  Body mass index is 16.51 kg/m².    Physical Exam   Constitutional: She appears cachectic. She appears ill. She is intubated.   HENT:   Head: Normocephalic.   Eyes: Conjunctivae are normal. Right eye exhibits no discharge. Left eye exhibits no discharge.   Neck: Normal range of motion. Neck supple.   Cardiovascular: Normal heart sounds and intact distal pulses.  An irregular rhythm present. Tachycardia present.    Pulses:       Radial pulses are  1+ on the right side, and 1+ on the left side.   Pulmonary/Chest: Effort normal. Tachypnea noted. She is intubated. No respiratory distress. She has rhonchi in the right upper field and the left upper field. She has rales in the right lower field and the left lower field.   Abdominal: Soft. She exhibits no distension. Bowel sounds are decreased. There is no tenderness.   Musculoskeletal: Normal range of motion.   Neurological: She is unresponsive. GCS eye subscore is 1. GCS verbal subscore is 1. GCS motor subscore is 1.   Skin: Skin is warm and dry. Capillary refill takes more than 3 seconds. There is pallor.           Significant Labs:   CBC:   Recent Labs  Lab 03/05/18  1244 03/05/18  1252 03/05/18  1417   WBC 7.80  --   --    HGB 7.0*  --   --    HCT 21.8* 18* 24*     --   --      CMP:   Recent Labs  Lab 03/05/18  1244      K 4.4      CO2 19*   *   BUN 10   CREATININE 1.0   CALCIUM 7.5*   PROT 5.0*   ALBUMIN 1.7*   BILITOT 0.2*   ALKPHOS 61   *   ALT <5*   ANIONGAP 20*   EGFRNONAA >60.0     Cardiac Markers:   Recent Labs  Lab 03/05/18 2001   BNP 27     Coagulation:   Recent Labs  Lab 03/05/18 2001   INR 1.2   APTT 30.9     Lactic Acid:   Recent Labs  Lab 03/05/18 2001   LACTATE 5.3*     Troponin:   Recent Labs  Lab 03/05/18  1244   TROPONINI 0.02       Significant Imaging: I have reviewed all pertinent imaging results/findings within the past 24 hours.    Assessment/Plan:     * Cardiac arrest    Patient intubated. Vent management per CC. Lactic 5.3, BNP- 27,   Troponin pending  Levophed ordered  Vanc/Zosyn ordered  IVF infusing  Electrolyte replacement protocols initiated.  Hypothermia protocol initiated            IVDU (intravenous drug user)    Patient reportedly was injecting heroin today. Was found by family not breathing/no pulse.            VTE Risk Mitigation         Ordered     Medium Risk of VTE  Once      03/05/18 1832     Place NIECY hose  Until discontinued       03/05/18 1832     Place sequential compression device  Until discontinued      03/05/18 1832             Derrick Schaefer NP  Department of Hospital Medicine   [facility]

## 2018-03-06 NOTE — PROCEDURES
Procedure: CVC/RIJ Trialysis    Indication: Vascular Access, Vasopressors    Procedure : Hermann Mensah MD    Informed Consent:  Informed consent was obtained for the procedure, including sedation.  Risks of lung perforation, hemorrhage, arrhythmia, and adverse drug reaction were discussed.    Procedure Summary:    A time-out was completed verifying correct patient, procedure, site, positioning, and special equipment if applicable. The patient was placed in a dependent position appropriate for central line placement based on the vein to be cannulated. The patients right neck was prepped and draped in sterile fashion. 1% Lidocaine was used to anesthetize the surrounding skin area. A triple lumen catheter was introduced into the the right internal jugular vein using the Seldinger technique and under ultrasound guidance. The catheter was threaded smoothly over the guide wire and appropriate blood return was obtained. Each lumen of the catheter was evacuated of air and flushed with sterile saline. The catheter was then sutured in place to the skin and a sterile dressing applied.     Complications: Note    Hermann Mensah MD, MSc  Pulmonary/Critical Care Fellow

## 2018-03-06 NOTE — EICU
eICU Note :    Called by the [facility]:    Problem:CT Head results : Loss of Grey -White demarcation suggestive of global hypoxic event    Pertinent History and labs reviewed : 42 y/o F with 30 pack history of active smoking IV drug use and vulvar cancer, on chemotherapy.Status post V. Fib cardiac arrest in the field on Hypothermia Protocol    Treatment /Intervention given:PCO2 to be kept 35-40 mmHg.[id number]    Genesis Villanueva M.D  eICU Physician  12:32 AM  NGT placement check, Abdominal Xray requested  1:40 AM  Heparin 5000 Units Sq q8 for DVT prophylaxis  2:31 AM  Decreased UO over last 4 hrs , to hold K and Mag replacements.Repeat Labs in 4 hrs  5:29 AM  NGT 1 inch above gastro esophageal junction, needs to be pushed in by approx 4 inches  6:25 AM  Lactic Acid 3.7 decreased from previous lactate of 4.5 to 3.7 .  PCO2 increased from 39.2--43.2 , will increase rate to 28 from 26

## 2018-03-06 NOTE — PROCEDURES
DATE OF PROCEDURE:  03/06/2018.    DURATION:  26 minutes and 47 seconds.    ELECTROENCEPHALOGRAM REPORT    METHODOLOGY:  Electroencephalographic (EEG) recording is recorded with   electrodes placed according to the International 10-20 placement system.  Thirty   two (32) channels of digital signal (sampling rate of 512/sec), including T1   and T2, were simultaneously recorded from the scalp and may include EKG, EMG,   and/or eye monitors.  Recording band pass was 0.1 to 512 Hz.  Digital video   recording of the patient is simultaneously recorded with the EEG.  The patient   is instructed to report clinical symptoms which may occur during the recording   session.  EEG and video recording are stored and archived in digital format.    Activation procedures, which include photic stimulation, hyperventilation and   instructing patients to perform simple tasks, are done in selected patients  The EEG is displayed on a monitor screen and can be reviewed using different   montages.  Computer assisted-analysis is employed to detect spike and   electrographic seizure activity.   The entire record is submitted for computer   analysis.  The entire recording is visually reviewed, and the times identified   by computer analysis as being spikes or seizures are reviewed again.  Compressed spectral analysis (CSA) is also performed on the activity recorded   from each individual channel.  This is displayed as a power display of   frequencies from 0 to 30 Hz over time.   The CSA is reviewed looking for   asymmetries in power between homologous areas of the scalp, then compared with   the original EEG recording.  [employer] software was also utilized in the review of this study.  This software   suite analyzes the EEG recording in multiple domains.  Coherence and rhythmicity   are computed to identify EEG sections which may contain organized seizures.    Each channel undergoes analysis to detect the presence of spike and sharp waves    which have special and morphological characteristics of epileptic activity.  The   routine EEG recording is converted from special into frequency domain.  This is   then displayed comparing homologous areas to identify areas of significant   asymmetry.  Algorithm to identify non-cortically generated artifact is used to   separate artifact from the EEG.  EEG FINDINGS:  This record is diffusely suppressed throughout all channels with   only low voltage delta and beta activity evident across the cerebral   hemispheres.  There are occasional movement artifact and EMG artifact seen,   which ____ the record, but otherwise record is completely in variant with only   severely suppressed diffuse cortical voltages evident.  There are no normal   brain waves.  No normal markers of sleep or wakefulness and no focal findings.    There are no seizures.    INTERPRETATION:  Abnormal EEG due to severe diffuse cortical suppression and   slowing of the entire background with little variability.  No seizures were   seen.      NBB/KARMEN  dd: 03/06/2018 15:39:45 (CST)  td: 03/06/2018 17:24:46 (CST)  Doc ID   #[id number]  Job ID #[id number]    CC:

## 2018-03-06 NOTE — PROCEDURES
Date: 3/5/18    Procedure: Arterial Line Insertion    Site Location: Attempted right and left radial arteries without success, subsequently proceeded to right femoral artery site with success.    Details:    After informed consent was obtained, the patient was positioned, prepped, and draped in the usual sterile fashion. Under direct ultrasound visualization, a right femoral arterial catheter was inserted with pulsatile blood flow noted and appropriate waveform on transduction. The catheter was secured to the skin with suture material, covered with a Biopatch, and secured with a sterile occlusive dressing. Patient tolerated well. No immediate complications.     Complications: none    Hermann Mensah MD, MSc  Pulmonary/Critical Care Fellow

## 2018-03-06 NOTE — PLAN OF CARE
Problem: Patient Care Overview  Goal: Plan of Care Review  Outcome: Ongoing (interventions implemented as appropriate)  Patient remains intubated and unresponsive. GCS 3, Pt makes random movement and tremors. Both pupils fixed dilated. Maintained levo drip at 1.1 mcg/kg/min.   No urine output since 12mn. Informed eicu regarding the PRN mg, calcium and potassium replacement, Instructed to hold for now since there is no Uo. RAUL notified. OG tube adjusted to lvl 52 at lips, still awaiting for Xray for verification.

## 2018-03-06 NOTE — CONSULTS
"Consult to Wound Care for wounds to vagina. Assisted by Shelby, floor nurse for patient. Pictures taken. Patient is an  IV drug user of heroin, diagnosed with Vulvar cancer and receives chemo/radiation, in which she uses morphine for pain. Additionally, patient has had Condyloma. Patient is intubated and unresponsive, therefore unable to give any information.    Perineal Area Between the Urethra and Anus - Partial skin loss, red moist base, with small white "bumps" surrounding wound base (genital warts). No body hair noted, probably due to radiation and chemo. Applied Triad Paste to the wound base to keep wound base moist, promote healing, and is used when unable to apply a dressing.    Recommend: Perineal Area - Apply Triad Paste during perineal care daily.    Nancy Cabezas RN, CWOCN    Perineal Area        Perineal Area            "

## 2018-03-06 NOTE — ASSESSMENT & PLAN NOTE
Appears to be secondary to cardiac arrest   CT scan of head shows complete loss of normal gray-white differentiation consistent with global hypoxic-ischemic event.  No evidence of intracranial hemorrhage  Critical care on board, await further recs

## 2018-03-06 NOTE — PLAN OF CARE
Problem: Ventilation, Mechanical Invasive (Adult)  Goal: Signs and Symptoms of Listed Potential Problems Will be Absent, Minimized or Managed (Ventilation, Mechanical Invasive)  Signs and symptoms of listed potential problems will be absent, minimized or managed by discharge/transition of care (reference Ventilation, Mechanical Invasive (Adult) CPG).   Outcome: Ongoing (interventions implemented as appropriate)  Patient received on mechanical vent, settings as documented. Sats 80 to 100%. No PRN Tx given.   Wants to keep PCO2 between 35 - 40. Pt. in no distress, will continue to monitor.

## 2018-03-06 NOTE — SUBJECTIVE & OBJECTIVE
Past Medical History:   Diagnosis Date    Condyloma        Past Surgical History:   Procedure Laterality Date     SECTION      4       Review of patient's allergies indicates:  No Known Allergies    Current Facility-Administered Medications on File Prior to Encounter   Medication    [COMPLETED] 0.9%  NaCl infusion    [COMPLETED] 0.9%  NaCl infusion    [COMPLETED] EPINEPHrine 0.1 mg/mL injection    [COMPLETED] sodium bicarbonate 8.4 % (1 mEq/mL) injection    [COMPLETED] sodium chloride 0.9% bolus 500 mL     Current Outpatient Prescriptions on File Prior to Encounter   Medication Sig    albuterol (VENTOLIN HFA) 90 mcg/actuation inhaler Inhale 2 puffs into the lungs every 6 (six) hours as needed for Wheezing. Rescue    dexamethasone (DECADRON) 0.5 MG tablet Take 0.5 mg by mouth 2 (two) times daily with meals.    docusate sodium (COLACE) 100 MG capsule Take 100 mg by mouth 2 (two) times daily.    morphine (MS CONTIN) 15 MG 12 hr tablet Take 15 mg by mouth 2 (two) times daily.    ondansetron (ZOFRAN) 4 MG tablet Take 8 mg by mouth 2 (two) times daily.    polyethylene glycol (GLYCOLAX) 17 gram/dose powder Take 17 g by mouth once daily.    promethazine (PHENERGAN) 25 MG tablet Take 25 mg by mouth every 4 (four) hours.    [DISCONTINUED] lidocaine HCL 2% (XYLOCAINE) 2 % jelly Apply to affected area daily prn    [DISCONTINUED] oxycodone-acetaminophen (PERCOCET) 5-325 mg per tablet Take 1 tablet by mouth every 4 (four) hours as needed for Pain.     Family History     Problem Relation (Age of Onset)    Breast cancer Mother    Throat cancer Maternal Grandmother        Social History Main Topics    Smoking status: Current Every Day Smoker    Smokeless tobacco: Never Used    Alcohol use No    Drug use: No    Sexual activity: No     Review of Systems   Unable to perform ROS: Intubated     Objective:     Vital Signs (Most Recent):  Temp: (!) 92.8 °F (33.8 °C) (18)  Pulse: (!) 116 (18  1952)  Resp: 19 (03/05/18 1952)  BP: (!) 108/58 (03/05/18 1917)  SpO2: 100 % (03/05/18 1952) Vital Signs (24h Range):  Temp:  [92.8 °F (33.8 °C)-95 °F (35 °C)] 92.8 °F (33.8 °C)  Pulse:  [] 116  Resp:  [11-25] 19  SpO2:  [96 %-100 %] 100 %  BP: ()/() 108/58     Weight: 45 kg (99 lb 3.3 oz)  Body mass index is 16.51 kg/m².    Physical Exam   Constitutional: She appears cachectic. She appears ill. She is intubated.   HENT:   Head: Normocephalic.   Eyes: Conjunctivae are normal. Right eye exhibits no discharge. Left eye exhibits no discharge.   Neck: Normal range of motion. Neck supple.   Cardiovascular: Normal heart sounds and intact distal pulses.  An irregular rhythm present. Tachycardia present.    Pulses:       Radial pulses are 1+ on the right side, and 1+ on the left side.   Pulmonary/Chest: Effort normal. Tachypnea noted. She is intubated. No respiratory distress. She has rhonchi in the right upper field and the left upper field. She has rales in the right lower field and the left lower field.   Abdominal: Soft. She exhibits no distension. Bowel sounds are decreased. There is no tenderness.   Musculoskeletal: Normal range of motion.   Neurological: She is unresponsive. GCS eye subscore is 1. GCS verbal subscore is 1. GCS motor subscore is 1.   Skin: Skin is warm and dry. Capillary refill takes more than 3 seconds. There is pallor.           Significant Labs:   CBC:   Recent Labs  Lab 03/05/18  1244 03/05/18  1252 03/05/18  1417   WBC 7.80  --   --    HGB 7.0*  --   --    HCT 21.8* 18* 24*     --   --      CMP:   Recent Labs  Lab 03/05/18  1244      K 4.4      CO2 19*   *   BUN 10   CREATININE 1.0   CALCIUM 7.5*   PROT 5.0*   ALBUMIN 1.7*   BILITOT 0.2*   ALKPHOS 61   *   ALT <5*   ANIONGAP 20*   EGFRNONAA >60.0     Cardiac Markers:   Recent Labs  Lab 03/05/18 2001   BNP 27     Coagulation:   Recent Labs  Lab 03/05/18 2001   INR 1.2   APTT 30.9     Lactic  Acid:   Recent Labs  Lab 03/05/18 2001   LACTATE 5.3*     Troponin:   Recent Labs  Lab 03/05/18  1244   TROPONINI 0.02       Significant Imaging: I have reviewed all pertinent imaging results/findings within the past 24 hours.

## 2018-03-06 NOTE — PROGRESS NOTES
Pt received intubated and on the ventilator with documented settings. No changes were made this shift. Will continue to monitor.

## 2018-03-06 NOTE — PROGRESS NOTES
Pulmonary/Critical Care Medicine Progress Note    Subjective/Interval Events:    -Afebrile overnight (temperature maintained between 33-36 degrees celsius)     -NE gtt titrated up overnight to 1.3    -CT Head with complete loss of normal grey-white differentiation (consistent with global hypoxic-ischemic event)        Vital Signs:   Vitals:    03/06/18 1340   BP:    Pulse: 110   Resp: (!) 27   Temp: (!) 93.2 °F (34 °C)       Fluid Balance:     Intake/Output Summary (Last 24 hours) at 03/06/18 1453  Last data filed at 03/06/18 0605   Gross per 24 hour   Intake          1656.48 ml   Output              310 ml   Net          1346.48 ml       Physical Exam:     General/Neuro: absent pupil/corneal/gag reflexes with myoclonic jerks in upper extremities (completely off sedation/analgesia)  Neck: No JVP   Cardiac: RRR with no MRG with brisk cap refill and symmetric pulses in distal extremities.  Respiratory: decreased breath sounds bilaterally   Abdomen: Soft, NT/ND. +BS.  Extremities/skin: evidence of track marks    Laboratory Studies:     Recent Labs  Lab 03/06/18  0616   PH 7.307*   PCO2 43.2   PO2 168*   HCO3 21.6*   POCSATURATED 99   BE -5       Recent Labs  Lab 03/06/18  0545   WBC 15.56*   RBC 3.39*   HGB 10.1*   HCT 30.8*   *   MCV 91   MCH 29.8   MCHC 32.8       Recent Labs  Lab 03/06/18  0545 03/06/18  0628   NA  --  139   K  --  4.1   CL  --  107   CO2  --  20*   BUN  --  20   CREATININE  --  1.7*   MG 1.4*  --        Microbiology Data:   Microbiology Results (last 7 days)     Procedure Component Value Units Date/Time    Culture, Respiratory with Gram Stain [[id number]] Collected:  03/06/18 0152    Order Status:  Completed Specimen:  Respiratory from Tracheal Aspirate Updated:  03/06/18 1332     Gram Stain (Respiratory) <10 epithelial cells per low power field.     Gram Stain (Respiratory) Many Gram positive cocci     Gram Stain (Respiratory) Many Gram negative rods     Gram Stain (Respiratory) Few Gram  negative diplococci    Blood culture [[id number]] Collected:  03/05/18 2149    Order Status:  Completed Specimen:  Blood from Peripheral, Hand, Left Updated:  03/06/18 0745     Blood Culture, Routine No Growth to date    Blood culture [[id number]] Collected:  03/05/18 2149    Order Status:  Completed Specimen:  Blood from Peripheral, Forearm, Left Updated:  03/06/18 0745     Blood Culture, Routine No Growth to date    Urine culture [[id number]] Collected:  03/05/18 2157    Order Status:  Sent Specimen:  Urine from Urine, Catheterized Updated:  03/06/18 0113    Culture, Respiratory with Gram Stain [[id number]]     Order Status:  Canceled Specimen:  Respiratory from Sputum            Imaging:     Chest X-Ray (3/5/18): patchy opacity within right lung base    CT Head (3/5/18): complete loss of normal gray-white differentiation consistent with global ischemic event.  No evidence of intracranial hemorrhage.    Assessment & Plan:     42 y/o female with a 30 pack year history of tobacco (actively smoking), IVDU (heroin, actively injecting), and Vulvar Cancer (diagnosed via biopsy at [facility], actively getting chemo/radiation) who was transferred from an OSH/ED after a cardiac arrest at home 3/5/18.       Last seen by boyfriend 30 min prior to seeing her on the floor (actively using heroin)-->Per EMS, she was in V-Fib and shocked 3 times/given Narcan with ROSC achieved after about 10 min.  She was subsequently brought to an OSH/ED where she arrested again-->PEA/Asytole with ROSC achieved after another 10 min.  The patient was then transferred to [facility] for post-arrest management.  At the time of her transfer, she was intubated (not in shock) with absent pupil/corneal/gag relfexes and jerking motions of her upper extremities.        On arrival to the ICU at [facility], Right Femoral Arterial Line/RIJ CVC lines placed (as patient became hypotensive requiring vasopressors)-->Bedside Ultrasound: no evidence of pericardial  "effussion/tamponade, appropriate cardiac contractility, no evidence of RV strain, difficult to visual IVC, no pleural fluid or abdominal fluid.  Two point lower extremity US for DVT negative bilaterally.     -->Pulmonary/Critical Care consulted for post-arrest management.     V-Fib Cardiac Arrest in the field (ROSC ~10 minutes) + Subsequent PEA/Asystole Arrest at OSH/ED (ROSC ~10 minutes)    ---->most likely secondary to drugs/toxins (actively using heroin, morphine, responded to narcan) leading to aspiration pneumonia/hypoxia and myocardial insult.     -Started on Hypothermia protocol on ICU transfer (~6 pm 3/5/18).  Will start rewarming phase after 24 hrs.  ECHO pending today.  Will continue with frequent blood draws q6hrs: ABG, CMP, CBC, INR, Lactic Acid Mg, Phos (given hypothermia protocol risk for infection, coagulapathy, "cold diuresis")    -CT head back: complete loss of normal gray-white differentiation consistent with global ischemic event.  No evidence of intracranial hemorrhage.  EEG ordered     -Will continue to cover broadly with Vanc/Zosyn for now given RLL opacity and IVDU (plan to de-escalate based on cultures)     -Received 2.5L NS, now on norepinephrine gtt targeting a MAP>65     -Too early to prognosticate s/p arrest; however poor signs thus far include absent pupil/corneal/gag relfexes and jerking motions of her upper extremities and new CT Head reading of completel loss of normal gray-white differentiation consistent with global ischemic event.  Will continue to assess neuro exam off sedation/analgesia (waiting on EEG to rule out possibility of seizures)     Acute Hypoxemic Hypercanpic Respiratory Failure (intubated at OSH) 2/2 Hypoventilation (in the setting of morphine/heroin) leading to aspiration event, cardiac arrest     -Currently on ~6 cc kg/PBW, Plateau Pressure <30, breathing at the vent (off sedation and analgesia, RASS-5)     -Avoiding sedation and analgesia for accurate neuro " evaluation.  In the event, her myoclonic jerks become more frequent and distressing to the family will consider propofol.     Shock: Distributive (Sepsis) vs. Post Arrest Shock.  Low suspicion for hemmorhagic shock or obstructive shock from tamponade, pneumothorax, or VTE disease based on bedside ultrasound and chest x-ray     -Received 2.5L of NS, now on norepinephrine gtt targetting MAP >65.  Will transfuse for hemoglobin <7     -Will follow up ECHO     DVT Prophylaxis: heparin     GI Prophylaxis: pantoprazole (while intubated, not home medication)     Goals of Care/Code Status: Extensive discussion held with the family (patient's mother, four children, and boyfriend) in the waiting room yesterday.  At that time, I relayed the patient's hospital course/interval events throughout the day, with an emphasis on her critical state.  On rounds today, we readdressed her code status and grim prognosis with interval CT head findings.  The patient's eldest daughter plans to continue discussions on code status and overall goals of care with her family.  For now, she will remain Full Code.    Hermann Mensah MD, MSc  Pulmonary/Critical Care Fellow

## 2018-03-07 NOTE — ASSESSMENT & PLAN NOTE
Initial creatinine 2.2  Down to 1.5,   Urine output still decreased  Will monitor  Most likely due to cardiac arrest

## 2018-03-07 NOTE — PLAN OF CARE
Problem: Ventilation, Mechanical Invasive (Adult)  Goal: Signs and Symptoms of Listed Potential Problems Will be Absent, Minimized or Managed (Ventilation, Mechanical Invasive)  Signs and symptoms of listed potential problems will be absent, minimized or managed by discharge/transition of care (reference Ventilation, Mechanical Invasive (Adult) CPG).   Outcome: Ongoing (interventions implemented as appropriate)  Patient received and remained on the mechanical ventilator throughout this shift with no changes made at this time, will continue to monitor.

## 2018-03-07 NOTE — SUBJECTIVE & OBJECTIVE
Interval History: No acute events overnight or this morning.  Daughter and family friend at bedside. Discussed current plan of care with family and they are in agreement.  Nurse at bedside.    Review of Systems   Unable to perform ROS: Intubated     Objective:     Vital Signs (Most Recent):  Temp: (!) 95.7 °F (35.4 °C) (03/06/18 1912)  Pulse: (!) 126 (03/06/18 1912)  Resp: (!) 28 (03/06/18 1912)  BP: (!) 130/92 (03/05/18 2245)  SpO2: 100 % (03/06/18 1912) Vital Signs (24h Range):  Temp:  [91.4 °F (33 °C)-95.7 °F (35.4 °C)] 95.7 °F (35.4 °C)  Pulse:  [102-130] 126  Resp:  [19-41] 28  SpO2:  [73 %-100 %] 100 %  BP: (130-161)/(77-92) 130/92  Arterial Line BP: ()/() 94/54     Weight: 45 kg (99 lb 3.3 oz)  Body mass index is 16.51 kg/m².    Intake/Output Summary (Last 24 hours) at 03/06/18 1943  Last data filed at 03/06/18 1800   Gross per 24 hour   Intake          1379.29 ml   Output              320 ml   Net          1059.29 ml      Physical Exam   Constitutional: She appears cachectic. She appears ill. She is intubated.   HENT:   Head: Normocephalic.   Eyes: Conjunctivae are normal. Right eye exhibits no discharge. Left eye exhibits no discharge.   Neck: Normal range of motion. Neck supple.   Cardiovascular: Normal heart sounds and intact distal pulses.  An irregular rhythm present. Tachycardia present.    Pulses:       Radial pulses are 1+ on the right side, and 1+ on the left side.   Pulmonary/Chest: Effort normal. Tachypnea noted. She is intubated. No respiratory distress. She has rhonchi in the right upper field and the left upper field. She has rales in the right lower field and the left lower field.   Abdominal: Soft. She exhibits no distension. Bowel sounds are decreased. There is no tenderness.   Musculoskeletal: Normal range of motion.   Neurological: She is unresponsive. GCS eye subscore is 1. GCS verbal subscore is 1. GCS motor subscore is 1.   Skin: Skin is warm and dry. Capillary refill takes  more than 3 seconds. There is pallor.       Significant Labs:   ABGs:   Recent Labs  Lab 03/06/18  0616   PH 7.307*   PCO2 43.2   HCO3 21.6*   POCSATURATED 99   BE -5     Blood Culture:   Recent Labs  Lab 03/05/18  2149   LABBLOO No Growth to date  No Growth to date     CBC:   Recent Labs  Lab 03/06/18  0545 03/06/18  1600 03/06/18  1615   WBC 15.56* 13.14* 13.19*   HGB 10.1* 9.9* 9.9*   HCT 30.8* 30.1* 29.6*   * 261 255     CMP:   Recent Labs  Lab 03/06/18  0628 03/06/18  0856 03/06/18  1600 03/06/18  1615     --  137 137   K 4.1  --  4.3 4.3     --  102 102   CO2 20*  --  22* 21*   GLU 98 87 60* 64*   BUN 20  --  24* 26*   CREATININE 1.7*  --  2.1* 2.2*   CALCIUM 7.9*  --  8.1* 8.4*   PROT 6.2  --  6.2 6.2   ALBUMIN 2.1*  --  2.1* 2.1*   BILITOT 0.2  --  0.3 0.4   ALKPHOS 87  --  81 75   *  --  282* 265*   ALT 56*  --  61* 58*   ANIONGAP 12  --  13 14   EGFRNONAA 36*  --  28* 26*     Lactic Acid:   Recent Labs  Lab 03/06/18  0012 03/06/18  0545 03/06/18  1600   LACTATE 4.5* 3.7* 2.7*     Magnesium:   Recent Labs  Lab 03/06/18  0545 03/06/18  1600 03/06/18  1615   MG 1.4* 2.2 2.3     POCT Glucose:   Recent Labs  Lab 03/06/18  0635 03/06/18  1825 03/06/18  1903   POCTGLUCOSE 88 57* 133*     All pertinent labs within the past 24 hours have been reviewed.    Significant Imaging: I have reviewed all pertinent imaging results/findings within the past 24 hours.

## 2018-03-07 NOTE — PROGRESS NOTES
Pt received intubated and on the ventilator with documented settings. No changes were made at this time. Will continue to monitor.

## 2018-03-07 NOTE — ASSESSMENT & PLAN NOTE
Patient intubated. Vent management per CC. Lactic 5.3 down to 2.7, BNP- 27,   Troponin up from 0.02 to 2.330  Currently on NE infusion  Continue Vanc/Zosyn   IVF infusing  Electrolyte replacement protocols initiated.  Hypothermia protocol initiated  Prognosis is poor  Currently on no sedation  Echo and EEG pending

## 2018-03-07 NOTE — PROGRESS NOTES
I have seen and examined the patient and agree with the trainee's note except as modified. Relevant studies/notes reviewed.     Tachycardia and hypotension overnight. Given 500cc NS bolus.     20cc urine output. +2L yesterday. Temp 98.2. , MAP 76 on levo. 100% on 40% FiO2.     WBC down to 12. Creatinine up to 2.9. bicarb 19.     EEG: severe diffuse cortical suppression and slowing of the entire background with little variability. No seizures    On my exam, pupils dilated. No response.     Echo: EF 25%.     Impression: Re-warmed after being cooled post cardiac arrest. EEG with significant diffuse slowing.     -stop vanc and cont zosyn for today  -family counseling regarding poor prognosis. Long discussion had at bedside with family (sister, mother, boyfriend, oldest daughter). With CT and EEG findings, combined with her clinical exam, I expressed to them that her chance of meaningful neurologic recovery is extremely small. They agree that if her heart were to stop, we should let her pass away without attempting resuscitation. Also, we have agreed to no escalation of care (e.g. Escalating pressors, HD, etc). Her sons (not present for our discussion) are not yet on board for withdrawal of care, but the rest of the family wants to pursue comfort care only. So for now, she is DNR with no escalation of care but will continue current treatments for now until there is a final decision regarding withdrawal. Daughter would like to be notified if she shows signs of worsening so that the family may gather.   -cont supportive care for now  -cont pressors    Critical care time (35min) spent personally by me on the following activities: development of treatment plan with patient or surrogate and bedside caregivers, discussions with consultants, evaluation of patient's response to treatment, examination of patient, ordering and performing treatments and interventions, ordering and review of laboratory studies, ordering and  review of radiographic studies, pulse oximetry, re-evaluation of patient's condition. This critical care time did not overlap with that of any other provider or involve time for any procedures.

## 2018-03-07 NOTE — EICU
Notified of tachycardia    42 y/o F history of vulvar CA on chemotherapy, heroin abuse.  Cardiac arrest at home.  Targeted temperature therapy initiated now re-warmed.    CT Head with complete loss of normal grey-white differentiation (consistent with global hypoxic-ischemic event)    Telemetry   BP 91/55 on norepinephrine, oliguric    · Ordered to bolus 500 cc NS

## 2018-03-07 NOTE — ASSESSMENT & PLAN NOTE
Patient reportedly was injecting heroin prior to cardiac arrest.   Was found by family not breathing/no pulse.

## 2018-03-07 NOTE — PROGRESS NOTES
Pulmonary/Critical Care Medicine: Daily Progress Note    Subjective/Interval Events:    -Finished hypothermia protocol yesterday ~ 6pm, now transitioned to passive rewarming    -Given 500 cc bolus by EICU overnight for tachycardia/hypotension, NE gtt persistently at 1.3     -EEG from yesterday: no seizures, diffuse cortical suppression    -ECHO: EF-25%, normal RV    Vital Signs:   Vitals:    03/07/18 1300   BP:    Pulse: (!) 126   Resp: (!) 27   Temp:        Fluid Balance:     Intake/Output Summary (Last 24 hours) at 03/07/18 1444  Last data filed at 03/07/18 0700   Gross per 24 hour   Intake          2037.07 ml   Output               20 ml   Net          2017.07 ml       Physical Exam:     General/Neuro: absent pupil/corneal/gag reflexes with myoclonic jerks in upper extremities (completely off sedation/analgesia)  Neck: No JVP   Cardiac: RRR with no MRG with brisk cap refill and symmetric pulses in distal extremities.  Respiratory: decreased breath sounds bilaterally   Abdomen: Soft, NT/ND. +BS.  Extremities/skin: evidence of track marks    Laboratory Studies:   No results for input(s): PH, PCO2, PO2, HCO3, POCSATURATED, BE in the last 24 hours.    Recent Labs  Lab 03/07/18  1315   WBC 10.71   RBC 2.89*   HGB 8.5*   HCT 25.3*      MCV 88   MCH 29.4   MCHC 33.6       Recent Labs  Lab 03/07/18  1315   *   K 5.3*      CO2 19*   BUN 32*   CREATININE 3.4*   MG 1.7       Microbiology Data:   Microbiology Results (last 7 days)     Procedure Component Value Units Date/Time    Culture, Respiratory with Gram Stain [[id number]] Collected:  03/06/18 0152    Order Status:  Completed Specimen:  Respiratory from Tracheal Aspirate Updated:  03/07/18 1017     Respiratory Culture Normal respiratory talha     Gram Stain (Respiratory) <10 epithelial cells per low power field.     Gram Stain (Respiratory) Many Gram positive cocci     Gram Stain (Respiratory) Many Gram negative rods     Gram Stain (Respiratory) Few  Gram negative diplococci    Urine culture [[id number]] Collected:  03/05/18 2157    Order Status:  Completed Specimen:  Urine from Urine, Catheterized Updated:  03/07/18 0724     Urine Culture, Routine No growth    Blood culture [[id number]] Collected:  03/05/18 2149    Order Status:  Completed Specimen:  Blood from Peripheral, Hand, Left Updated:  03/07/18 0612     Blood Culture, Routine No Growth to date     Blood Culture, Routine No Growth to date    Blood culture [[id number]] Collected:  03/05/18 2149    Order Status:  Completed Specimen:  Blood from Peripheral, Forearm, Left Updated:  03/07/18 0612     Blood Culture, Routine No Growth to date     Blood Culture, Routine No Growth to date    Culture, Respiratory with Gram Stain [[id number]]     Order Status:  Canceled Specimen:  Respiratory from Sputum            Chest Imaging:     Chest X-Ray (3/5/18): patchy opacity within right lung base     CT Head (3/5/18): complete loss of normal gray-white differentiation consistent with global ischemic event.  No evidence of intracranial hemorrhage.    EEG: no seizures but diffuse cortical suppression    Assessment & Plan:     42 y/o female with a 30 pack year history of tobacco (actively smoking), IVDU (heroin, actively injecting), and Vulvar Cancer (diagnosed via biopsy at [facility], actively getting chemo/radiation) who was transferred from an OSH/ED after a cardiac arrest at home 3/5/18-->Pulmonary/Critical Care consulted for post-arrest management.     V-Fib Cardiac Arrest in the field (ROSC ~10 minutes) + Subsequent PEA/Asystole Arrest at OSH/ED (ROSC ~10 minutes)     ---->most likely secondary to drugs/toxins (actively using heroin, morphine, responded to narcan) leading to aspiration pneumonia/hypoxia and myocardial insult.     -Started on Hypothermia protocol on ICU transfer (~6 pm 3/5/18), now transitioned to passive rewarming.    -CT head back: complete loss of normal gray-white differentiation consistent with global  ischemic event, no evidence of intracranial hemorrhage.  EEG: no seizures but diffuse cortical suppression ordered     -De-escalated vancomycin today with NGTD of cultures. WIll continue Zosyn for now given RLL opacity    -Received a total of 3L IVF, still with high norepinephrine gtt requireiemtsn, targeting a MAP>65    -ECHO results back with EF-25%, normal RV    -Poor prognostic signs at this time include absent pupil/corneal/gag relfexes, jerking motions of her upper extremities, CT Head with complete loss of normal gray-white differentiation consistent with global ischemic event, and EEG with diffuse cortical suppression (no seizures)    -Family updated at bedside (Eldest daughter-Ophelia, mother and boyfriend).  Code Status now changed to DNR with no escalation of care.  Family leaning towards withdrawal but still discussing.    Acute Hypoxemic Hypercanpic Respiratory Failure (intubated at OSH) 2/2 Hypoventilation (in the setting of morphine/heroin) leading to aspiration event, cardiac arrest     -Currently on ~6 cc kg/PBW, Plateau Pressure <30, breathing slightly above the vent (off sedation and analgesia, RASS-5)     -Avoiding sedation and analgesia for accurate neuro evaluation.  In the event, her myoclonic jerks become more frequent and distressing to the family will consider propofol.     Shock: Distributive (Sepsis) vs. Post Arrest Shock.  Low suspicion for hemmorhagic shock or obstructive shock from tamponade, pneumothorax, or VTE disease based on bedside ultrasound and chest x-ray     -Received 3L of IVF but with persistently high norepinephrine gtt requirements, targetting MAP >65.  Will transfuse for hemoglobin <7     DVT Prophylaxis: heparin     GI Prophylaxis: pantoprazole (while intubated, not home medication)     Goals of Care/Code Status: Extensive discussion held with the family by Dr. Leandra samaniego AM (patient's mother, oldest child: daughter, and boyfriend).  He expressed that the patient's  chance of meaningful neurologic recovery is extremely small. He subsequently addressed code status and overall goals of care moving forward.  At this time, the patient is DNR with no escalation of care ( no escalation of vasopressors, HD, etc.) Per his discussions, daughter and mother have come to the understanding of poor prognosis/withdrawal of care but the patients' sons are having a tough time (not present). The family plans to continue discussions moving forward on withdrawal of care. DNR order placed.    Hermann Mensah MD, MSc  Pulmonary/Critical Care Fellow

## 2018-03-07 NOTE — PROGRESS NOTES
[facility] Hospital Medicine  Progress Note    Patient Name: Sarahi Marquez  MRN: [id number]  Patient Class: IP- Inpatient   Admission Date: 3/5/2018  Length of Stay: 1 days  Attending Physician: Viktoriya Muniz, *  Primary Care Provider: Primary Doctor No        Subjective:     Principal Problem:Cardiac arrest    HPI:  43-year-old female presents and cardiac and respiratory arrest.  Family reports patient was using heroin when earlier today and was later found by  staring blankly into space and not breathing or moving.  Family called 911 and patient was in asystole on arrival.  ACLS protocol was initiated, Prashant was placed, and patient was intubated prior to arrival to the hospital.  At arrival patient had a palpable pulse.  Several minutes later patient went into PA and CPR was again initiated.  Patient was placed on a transcutaneous pacemaker and pulse was reobtained.    Patient was transferred to [facility] for Critical Care services.  Upon arrival, an arterial line was placed via CC. Patient was initiated on Levophed due to MAP being below 65.         Hospital Course:  No notes on file    Interval History: No acute events overnight or this morning.  Daughter and family friend at bedside. Discussed current plan of care with family and they are in agreement.  Nurse at bedside.    Review of Systems   Unable to perform ROS: Intubated     Objective:     Vital Signs (Most Recent):  Temp: (!) 95.7 °F (35.4 °C) (03/06/18 1912)  Pulse: (!) 126 (03/06/18 1912)  Resp: (!) 28 (03/06/18 1912)  BP: (!) 130/92 (03/05/18 2245)  SpO2: 100 % (03/06/18 1912) Vital Signs (24h Range):  Temp:  [91.4 °F (33 °C)-95.7 °F (35.4 °C)] 95.7 °F (35.4 °C)  Pulse:  [102-130] 126  Resp:  [19-41] 28  SpO2:  [73 %-100 %] 100 %  BP: (130-161)/(77-92) 130/92  Arterial Line BP: ()/() 94/54     Weight: 45 kg (99 lb 3.3 oz)  Body mass index is 16.51 kg/m².    Intake/Output Summary (Last 24 hours) at 03/06/18  1943  Last data filed at 03/06/18 1800   Gross per 24 hour   Intake          1379.29 ml   Output              320 ml   Net          1059.29 ml      Physical Exam   Constitutional: She appears cachectic. She appears ill. She is intubated.   HENT:   Head: Normocephalic.   Eyes: Conjunctivae are normal. Right eye exhibits no discharge. Left eye exhibits no discharge.   Neck: Normal range of motion. Neck supple.   Cardiovascular: Normal heart sounds and intact distal pulses.  An irregular rhythm present. Tachycardia present.    Pulses:       Radial pulses are 1+ on the right side, and 1+ on the left side.   Pulmonary/Chest: Effort normal. Tachypnea noted. She is intubated. No respiratory distress. She has rhonchi in the right upper field and the left upper field. She has rales in the right lower field and the left lower field.   Abdominal: Soft. She exhibits no distension. Bowel sounds are decreased. There is no tenderness.   Musculoskeletal: Normal range of motion.   Neurological: She is unresponsive. GCS eye subscore is 1. GCS verbal subscore is 1. GCS motor subscore is 1.   Skin: Skin is warm and dry. Capillary refill takes more than 3 seconds. There is pallor.       Significant Labs:   ABGs:   Recent Labs  Lab 03/06/18  0616   PH 7.307*   PCO2 43.2   HCO3 21.6*   POCSATURATED 99   BE -5     Blood Culture:   Recent Labs  Lab 03/05/18  2149   LABBLOO No Growth to date  No Growth to date     CBC:   Recent Labs  Lab 03/06/18  0545 03/06/18  1600 03/06/18  1615   WBC 15.56* 13.14* 13.19*   HGB 10.1* 9.9* 9.9*   HCT 30.8* 30.1* 29.6*   * 261 255     CMP:   Recent Labs  Lab 03/06/18  0628 03/06/18  0856 03/06/18  1600 03/06/18  1615     --  137 137   K 4.1  --  4.3 4.3     --  102 102   CO2 20*  --  22* 21*   GLU 98 87 60* 64*   BUN 20  --  24* 26*   CREATININE 1.7*  --  2.1* 2.2*   CALCIUM 7.9*  --  8.1* 8.4*   PROT 6.2  --  6.2 6.2   ALBUMIN 2.1*  --  2.1* 2.1*   BILITOT 0.2  --  0.3 0.4   ALKPHOS 87   --  81 75   *  --  282* 265*   ALT 56*  --  61* 58*   ANIONGAP 12  --  13 14   EGFRNONAA 36*  --  28* 26*     Lactic Acid:   Recent Labs  Lab 03/06/18  0012 03/06/18  0545 03/06/18  1600   LACTATE 4.5* 3.7* 2.7*     Magnesium:   Recent Labs  Lab 03/06/18  0545 03/06/18  1600 03/06/18  1615   MG 1.4* 2.2 2.3     POCT Glucose:   Recent Labs  Lab 03/06/18  0635 03/06/18  1825 03/06/18  1903   POCTGLUCOSE 88 57* 133*     All pertinent labs within the past 24 hours have been reviewed.    Significant Imaging: I have reviewed all pertinent imaging results/findings within the past 24 hours.    Assessment/Plan:      * Cardiac arrest    Patient intubated. Vent management per CC. Lactic 5.3 down to 2.7, BNP- 27,   Troponin up from 0.02 to 2.330  Currently on NE infusion  Continue Vanc/Zosyn   IVF infusing  Electrolyte replacement protocols initiated.  Hypothermia protocol initiated  Prognosis is poor  Currently on no sedation  Echo and EEG pending        Acute hypercapnic respiratory failure    Appears to be secondary to cardiac arrest   CT scan of head shows complete loss of normal gray-white differentiation consistent with global hypoxic-ischemic event.  No evidence of intracranial hemorrhage  Critical care on board, await further recs            FUNMILAYO (acute kidney injury)    Initial creatinine 2.2  Down to 1.5,   Urine output still decreased  Will monitor  Most likely due to cardiac arrest          Leukocytosis    -Initially 25, down to 13  -Blood cultures NGTD  -Cont Vanc and Zosyn empirically  -Follow CXRs        IVDU (intravenous drug user)    Patient reportedly was injecting heroin prior to cardiac arrest.   Was found by family not breathing/no pulse.            VTE Risk Mitigation         Ordered     heparin (porcine) injection 5,000 Units  Every 8 hours     Route:  Subcutaneous        03/06/18 0140     Medium Risk of VTE  Once      03/05/18 1832     Place NIECY hose  Until discontinued      03/05/18 1832     Place  sequential compression device  Until discontinued      03/05/18 0291              Viktoriya Muniz MD  Department of Hospital Medicine   [facility]

## 2018-03-07 NOTE — PLAN OF CARE
Problem: Patient Care Overview  Goal: Plan of Care Review  Outcome: Ongoing (interventions implemented as appropriate)  Patient remains intubated and unresponsive. completed re-warming and maintained normothermic. Informed eicu regarding tachycardia and hypotension, instructed to give 500cc NS bolus. Still on levo drip at 1.2 ug/kg/min. PRN D50 12.5mg given x2. Turned Q2h. Will continue to monitor.

## 2018-03-08 NOTE — SUBJECTIVE & OBJECTIVE
Interval History: No acute events overnight or this morning.  Daughter and family at bedside. Awaiting evaluation by pulmonary/critical care    Review of Systems   Unable to perform ROS: Intubated     Objective:     Vital Signs (Most Recent):  Temp: 98.2 °F (36.8 °C) (03/07/18 2127)  Pulse: (!) 132 (03/07/18 2127)  Resp: (!) 27 (03/07/18 2127)  BP: 126/63 (03/07/18 2000)  SpO2: 100 % (03/07/18 2127) Vital Signs (24h Range):  Temp:  [94.6 °F (34.8 °C)-100 °F (37.8 °C)] 98.2 °F (36.8 °C)  Pulse:  [118-148] 132  Resp:  [22-36] 27  SpO2:  [100 %] 100 %  BP: ()/(50-63) 126/63  Arterial Line BP: ()/(50-84) 122/76     Weight: 47 kg (103 lb 9.9 oz)  Body mass index is 17.24 kg/m².    Intake/Output Summary (Last 24 hours) at 03/07/18 2155  Last data filed at 03/07/18 2000   Gross per 24 hour   Intake          1681.26 ml   Output               20 ml   Net          1661.26 ml      Physical Exam   Constitutional: She appears cachectic. She appears ill. She is intubated.   HENT:   Head: Normocephalic.   Eyes: Conjunctivae are normal. Right eye exhibits no discharge. Left eye exhibits no discharge.   Neck: Normal range of motion. Neck supple.   Cardiovascular: Normal heart sounds and intact distal pulses.  An irregular rhythm present. Tachycardia present.    Pulmonary/Chest: Effort normal. No tachypnea. She is intubated. No respiratory distress. She has no rales.   Abdominal: Soft. She exhibits no distension. Bowel sounds are decreased. There is no tenderness.   Musculoskeletal: Normal range of motion.   Neurological: She is unresponsive. GCS eye subscore is 1. GCS verbal subscore is 1. GCS motor subscore is 1.   Skin: Skin is warm and dry. Capillary refill takes more than 3 seconds. There is pallor.       Significant Labs:   ABGs:   Recent Labs  Lab 03/06/18  0616   PH 7.307*   PCO2 43.2   HCO3 21.6*   POCSATURATED 99   BE -5     CBC:   Recent Labs  Lab 03/07/18  0545 03/07/18  1315 03/07/18  1811   WBC 11.75 10.71  10.62   HGB 9.0* 8.5* 8.3*   HCT 26.9* 25.3* 25.0*    201 202     CMP:   Recent Labs  Lab 03/07/18  0545 03/07/18  1315 03/07/18  1811   * 133* 133*   K 4.5 5.3* 5.9*    100 101   CO2 19* 19* 16*    97 101   BUN 30* 32* 33*   CREATININE 2.9* 3.4* 3.6*   CALCIUM 8.1* 8.4* 8.5*   PROT 5.6* 5.7* 5.7*   ALBUMIN 1.8* 1.8* 1.7*   BILITOT 0.5 0.6 0.6   ALKPHOS 70 70 68   * 145* 126*   ALT 48* 44 38   ANIONGAP 12 14 16   EGFRNONAA 19* 16* 14*     Magnesium:   Recent Labs  Lab 03/07/18  0545 03/07/18  1315 03/07/18  1811   MG 1.8 1.7 1.9     POCT Glucose:   Recent Labs  Lab 03/07/18  0558 03/07/18  0908 03/07/18  1329   POCTGLUCOSE 87 97 106     All pertinent labs within the past 24 hours have been reviewed.    Significant Imaging: I have reviewed all pertinent imaging results/findings within the past 24 hours.

## 2018-03-08 NOTE — ASSESSMENT & PLAN NOTE
Patient intubated. Vent management per CC. Lactic 5.3 down to 2.7, BNP- 27,   Troponin up from 0.02 to 2.330  Currently on NE infusion  Continue Vanc/Zosyn   IVF infusing  Electrolyte replacement protocols initiated.  Hypothermia protocol initiated  Prognosis is poor  Currently on no sedation  Echo: EF 25% with diastolic dysfunction  EEG: Abnormal EEG due to severe diffuse cortical suppression and   slowing of the entire background with little variability.  No seizures were   Seen.  Awaiting recommendations from Critical Care

## 2018-03-08 NOTE — PLAN OF CARE
Problem: Patient Care Overview  Goal: Plan of Care Review  Outcome: Ongoing (interventions implemented as appropriate)  Pt remains on [facility] vent with documented settings. Family at bedside discussing withdrawal of care.

## 2018-03-08 NOTE — PROGRESS NOTES
[facility] Hospital Medicine  Progress Note    Patient Name: Sarahi Marquez  MRN: [id number]  Patient Class: IP- Inpatient   Admission Date: 3/5/2018  Length of Stay: 2 days  Attending Physician: Viktoriya Muniz, *  Primary Care Provider: Primary Doctor No        Subjective:     Principal Problem:Cardiac arrest    HPI:  43-year-old female presents and cardiac and respiratory arrest.  Family reports patient was using heroin when earlier today and was later found by  staring blankly into space and not breathing or moving.  Family called 911 and patient was in asystole on arrival.  ACLS protocol was initiated, Prashant was placed, and patient was intubated prior to arrival to the hospital.  At arrival patient had a palpable pulse.  Several minutes later patient went into PA and CPR was again initiated.  Patient was placed on a transcutaneous pacemaker and pulse was reobtained.    Patient was transferred to [facility] for Critical Care services.  Upon arrival, an arterial line was placed via CC. Patient was initiated on Levophed due to MAP being below 65.         Hospital Course:  No notes on file    Interval History: No acute events overnight or this morning.  Daughter and family at bedside. Awaiting evaluation by pulmonary/critical care    Review of Systems   Unable to perform ROS: Intubated     Objective:     Vital Signs (Most Recent):  Temp: 98.2 °F (36.8 °C) (03/07/18 2127)  Pulse: (!) 132 (03/07/18 2127)  Resp: (!) 27 (03/07/18 2127)  BP: 126/63 (03/07/18 2000)  SpO2: 100 % (03/07/18 2127) Vital Signs (24h Range):  Temp:  [94.6 °F (34.8 °C)-100 °F (37.8 °C)] 98.2 °F (36.8 °C)  Pulse:  [118-148] 132  Resp:  [22-36] 27  SpO2:  [100 %] 100 %  BP: ()/(50-63) 126/63  Arterial Line BP: ()/(50-84) 122/76     Weight: 47 kg (103 lb 9.9 oz)  Body mass index is 17.24 kg/m².    Intake/Output Summary (Last 24 hours) at 03/07/18 2155  Last data filed at 03/07/18 2000   Gross per 24 hour    Intake          1681.26 ml   Output               20 ml   Net          1661.26 ml      Physical Exam   Constitutional: She appears cachectic. She appears ill. She is intubated.   HENT:   Head: Normocephalic.   Eyes: Conjunctivae are normal. Right eye exhibits no discharge. Left eye exhibits no discharge.   Neck: Normal range of motion. Neck supple.   Cardiovascular: Normal heart sounds and intact distal pulses.  An irregular rhythm present. Tachycardia present.    Pulmonary/Chest: Effort normal. No tachypnea. She is intubated. No respiratory distress. She has no rales.   Abdominal: Soft. She exhibits no distension. Bowel sounds are decreased. There is no tenderness.   Musculoskeletal: Normal range of motion.   Neurological: She is unresponsive. GCS eye subscore is 1. GCS verbal subscore is 1. GCS motor subscore is 1.   Skin: Skin is warm and dry. Capillary refill takes more than 3 seconds. There is pallor.       Significant Labs:   ABGs:   Recent Labs  Lab 03/06/18  0616   PH 7.307*   PCO2 43.2   HCO3 21.6*   POCSATURATED 99   BE -5     CBC:   Recent Labs  Lab 03/07/18  0545 03/07/18  1315 03/07/18  1811   WBC 11.75 10.71 10.62   HGB 9.0* 8.5* 8.3*   HCT 26.9* 25.3* 25.0*    201 202     CMP:   Recent Labs  Lab 03/07/18  0545 03/07/18  1315 03/07/18  1811   * 133* 133*   K 4.5 5.3* 5.9*    100 101   CO2 19* 19* 16*    97 101   BUN 30* 32* 33*   CREATININE 2.9* 3.4* 3.6*   CALCIUM 8.1* 8.4* 8.5*   PROT 5.6* 5.7* 5.7*   ALBUMIN 1.8* 1.8* 1.7*   BILITOT 0.5 0.6 0.6   ALKPHOS 70 70 68   * 145* 126*   ALT 48* 44 38   ANIONGAP 12 14 16   EGFRNONAA 19* 16* 14*     Magnesium:   Recent Labs  Lab 03/07/18  0545 03/07/18  1315 03/07/18  1811   MG 1.8 1.7 1.9     POCT Glucose:   Recent Labs  Lab 03/07/18  0558 03/07/18  0908 03/07/18  1329   POCTGLUCOSE 87 97 106     All pertinent labs within the past 24 hours have been reviewed.    Significant Imaging: I have reviewed all pertinent imaging  results/findings within the past 24 hours.    Assessment/Plan:      * Cardiac arrest    Patient intubated. Vent management per CC. Lactic 5.3 down to 2.7, BNP- 27,   Troponin up from 0.02 to 2.330  Currently on NE infusion  Continue Vanc/Zosyn   IVF infusing  Electrolyte replacement protocols initiated.  Hypothermia protocol initiated  Prognosis is poor  Currently on no sedation  Echo: EF 25% with diastolic dysfunction  EEG: Abnormal EEG due to severe diffuse cortical suppression and   slowing of the entire background with little variability.  No seizures were   Seen.  Awaiting recommendations from Critical Care        Acute hypercapnic respiratory failure    Appears to be secondary to cardiac arrest   CT scan of head shows complete loss of normal gray-white differentiation consistent with global hypoxic-ischemic event.  No evidence of intracranial hemorrhage  Critical care on board, await further recs            FUNMILAYO (acute kidney injury)    Initial creatinine 1.3  Up to 3.6  Urine output still decreased  Will monitor  Most likely due to cardiac arrest          Leukocytosis    -Initially 25, down to 10  -Blood cultures NGTD  -Cont Zosyn empirically  -Follow CXRs        Moderate malnutrition              Wounds, multiple              IVDU (intravenous drug user)    Patient reportedly was injecting heroin prior to cardiac arrest.   Was found by family not breathing/no pulse.            VTE Risk Mitigation         Ordered     heparin (porcine) injection 5,000 Units  Every 8 hours     Route:  Subcutaneous        03/06/18 0140     Medium Risk of VTE  Once      03/05/18 1832     Place NIECY hose  Until discontinued      03/05/18 1832     Place sequential compression device  Until discontinued      03/05/18 1832              Viktoriya Muniz MD  Department of Hospital Medicine   [facility]

## 2018-03-08 NOTE — ASSESSMENT & PLAN NOTE
Initial creatinine 1.3  Up to 3.6  Urine output still decreased  Will monitor  Most likely due to cardiac arrest

## 2018-03-08 NOTE — PROGRESS NOTES
Pulmonary/Critical Care Medicine: Daily Progress Note    Subjective/Interval Events:    -Afebrile overnight, NE weaned down to 0.3    -One son present in the room this AM (informed me that his brothers, sisters and family are planning on coming in at 3 pm)    Vital Signs:   Vitals:    03/08/18 0724   BP:    Pulse: (!) 126   Resp: (!) 27   Temp: 97.5 °F (36.4 °C)       Fluid Balance:     Intake/Output Summary (Last 24 hours) at 03/08/18 1004  Last data filed at 03/08/18 0430   Gross per 24 hour   Intake              777 ml   Output               60 ml   Net              717 ml       Physical Exam:     General/Neuro: absent pupil/corneal/gag reflexes with myoclonic jerks in upper extremities (completely off sedation/analgesia)  Neck: No JVP   Cardiac: RRR with no MRG with brisk cap refill and symmetric pulses in distal extremities.  Respiratory: decreased breath sounds bilaterally   Abdomen: Soft, NT/ND. +BS.  Extremities/skin: evidence of track marks    Laboratory Studies:   No results for input(s): PH, PCO2, PO2, HCO3, POCSATURATED, BE in the last 24 hours.    Recent Labs  Lab 03/08/18  0555   WBC 9.15   RBC 2.44*   HGB 7.2*   HCT 21.2*      MCV 87   MCH 29.5   MCHC 34.0       Recent Labs  Lab 03/08/18  0555   *   K 5.9*   CL 99   CO2 19*   BUN 39*   CREATININE 4.4*   MG 1.8       Microbiology Data:   Microbiology Results (last 7 days)     Procedure Component Value Units Date/Time    Blood culture [[id number]] Collected:  03/05/18 2149    Order Status:  Completed Specimen:  Blood from Peripheral, Hand, Left Updated:  03/08/18 0612     Blood Culture, Routine No Growth to date     Blood Culture, Routine No Growth to date     Blood Culture, Routine No Growth to date    Blood culture [[id number]] Collected:  03/05/18 2149    Order Status:  Completed Specimen:  Blood from Peripheral, Forearm, Left Updated:  03/08/18 0612     Blood Culture, Routine No Growth to date     Blood Culture, Routine No Growth to date      Blood Culture, Routine No Growth to date    Culture, Respiratory with Gram Stain [[id number]] Collected:  03/06/18 0152    Order Status:  Completed Specimen:  Respiratory from Tracheal Aspirate Updated:  03/07/18 1017     Respiratory Culture Normal respiratory talha     Gram Stain (Respiratory) <10 epithelial cells per low power field.     Gram Stain (Respiratory) Many Gram positive cocci     Gram Stain (Respiratory) Many Gram negative rods     Gram Stain (Respiratory) Few Gram negative diplococci    Urine culture [[id number]] Collected:  03/05/18 2157    Order Status:  Completed Specimen:  Urine from Urine, Catheterized Updated:  03/07/18 0724     Urine Culture, Routine No growth    Culture, Respiratory with Gram Stain [[id number]]     Order Status:  Canceled Specimen:  Respiratory from Sputum            Chest Imaging:      Chest X-Ray (3/5/18): patchy opacity within right lung base     CT Head (3/5/18): complete loss of normal gray-white differentiation consistent with global ischemic event.  No evidence of intracranial hemorrhage.     Tests/Procedures:    EEG: no seizures but diffuse cortical suppression    ECHO: EF-25%, normal RV    Assessment & Plan:     42 y/o female with a 30 pack year history of tobacco (actively smoking), IVDU (heroin, actively injecting), and Vulvar Cancer (diagnosed via biopsy at [facility], actively getting chemo/radiation) who was transferred from an OSH/ED after a cardiac arrest at home 3/5/18-->Pulmonary/Critical Care consulted for post-arrest management.     Hospital Course thus far: V-Fib Cardiac Arrest in the field (ROSC ~10 minutes) + Subsequent PEA/Asystole Arrest at OSH/ED (ROSC ~10 minutes)---->most likely secondary to drugs/toxins (actively using heroin, morphine, responded to narcan) leading to aspiration pneumonia/hypoxia and myocardial insult.  Currently intubated and on vasopressors.    -She is now s/p hypothermia protocol, with 72 hr post arrest mady approaching this AM.       -On physical exam, she has absent pupil/corneal/gag relfexes with jerking motions of her upper extremities    -CT Head with complete loss of normal gray-white differentiation consistent with global ischemic event and EEG with diffuse cortical suppression    -Goals of care discussions have been taking place throughout the patient's hospitalization (next of kin is the patient's oldest child: daughter, as she is not )  Yesterday, the patient's chance of meaningful neurologic recovery being extremely small was relayed to the family.  The patient's daughter and mother ultimately came to the decision to change the code status to DNR (no escalation of care with increasing vasopressors or HD).  However, they wanted to get the rest of the family together on making a decision on possibly withdrawing care.  This morning I spoke with the son at bedside who informed me that the entire family will be here at 3 pm for a family meeting.     Hermann Mensah MD, MSc  Pulmonary/Critical Care Fellow

## 2018-03-08 NOTE — PLAN OF CARE
Problem: Patient Care Overview  Goal: Plan of Care Review  Pt remains intubated and unresponsive with a GCS of 3.  At start of shift, pt with random movement of bilateral upper and lower extremities and tremors to bilateral upper extremities.  As shift progressed, bilateral upper and lower extremities became more rigid and movements reduced.  Pt's pupils fixed, dilated.  Pt remains tachycardic and continuous norepinephrine titrated down throughout shift with target MAP of >65.  Pt with occasional low grade fever but returned to normal with removal of sheet/blanket.  PRN calcium gluconate administered twice for Ca ion of 1.08 per order.  Pt with minimal urine output (45 mls total for shift).  Small amount of creamy yellow discharge from perineum, perineum cleansed as needed, wound performed X3 and coloplast wound cream applied to affected area.  SCDs in place and VTE prophylaxis administered per order.  Pt repositioned every 2 hours and no noted new skin breakdown.  Daughter and son at bedside throughout shift.

## 2018-03-09 NOTE — PROGRESS NOTES
[facility] Hospital Medicine  Progress Note    Patient Name: Sarahi Marquez  MRN: [id number]  Patient Class: IP- Inpatient   Admission Date: 3/5/2018  Length of Stay: 3 days  Attending Physician: Viktoriya Muniz, *  Primary Care Provider: Primary Doctor No        Subjective:     Principal Problem:Cardiac arrest    HPI:  43-year-old female presents and cardiac and respiratory arrest.  Family reports patient was using heroin when earlier today and was later found by  staring blankly into space and not breathing or moving.  Family called 911 and patient was in asystole on arrival.  ACLS protocol was initiated, Prashant was placed, and patient was intubated prior to arrival to the hospital.  At arrival patient had a palpable pulse.  Several minutes later patient went into PA and CPR was again initiated.  Patient was placed on a transcutaneous pacemaker and pulse was reobtained.    Patient was transferred to [facility] for Critical Care services.  Upon arrival, an arterial line was placed via CC. Patient was initiated on Levophed due to MAP being below 65.         Hospital Course:  No notes on file    Interval History: No acute events overnight or this morning.  Mother at bedside.  Critical Care has made plans with family to withdraw care, time of withdrawal has not been set at this time.  Family has not decided at this time.     Review of Systems   Unable to perform ROS: Intubated     Objective:     Vital Signs (Most Recent):  Temp: (!) 94.3 °F (34.6 °C) (03/08/18 1920)  Pulse: 96 (03/08/18 1920)  Resp: 20 (03/08/18 1920)  BP: (!) 82/59 (03/08/18 1800)  SpO2: 100 % (03/08/18 1920) Vital Signs (24h Range):  Temp:  [93.9 °F (34.4 °C)-99.7 °F (37.6 °C)] 94.3 °F (34.6 °C)  Pulse:  [] 96  Resp:  [15-29] 20  SpO2:  [100 %] 100 %  BP: ()/() 82/59  Arterial Line BP: ()/() 84/46     Weight: 48.9 kg (107 lb 12.9 oz)  Body mass index is 17.94 kg/m².    Intake/Output Summary  (Last 24 hours) at 03/08/18 1948  Last data filed at 03/08/18 1841   Gross per 24 hour   Intake           776.59 ml   Output               80 ml   Net           696.59 ml      Physical Exam   Constitutional: She appears cachectic. She appears ill. She is intubated.   HENT:   Head: Normocephalic.   Eyes: Conjunctivae are normal. Right eye exhibits no discharge. Left eye exhibits no discharge.   Neck: Normal range of motion. Neck supple.   Cardiovascular: Normal heart sounds and intact distal pulses.  An irregular rhythm present. Tachycardia present.    Pulmonary/Chest: Effort normal. No tachypnea. She is intubated. No respiratory distress. She has no rales.   Abdominal: Soft. She exhibits no distension. Bowel sounds are decreased. There is no tenderness.   Musculoskeletal: Normal range of motion.   Neurological: She is unresponsive. GCS eye subscore is 1. GCS verbal subscore is 1. GCS motor subscore is 1.   Skin: Skin is warm and dry. Capillary refill takes more than 3 seconds. There is pallor.       Significant Labs:     CBC:   Recent Labs  Lab 03/08/18  0024 03/08/18  0555 03/08/18  1215   WBC 11.64 9.15 8.86   HGB 8.1* 7.2* 6.6*   HCT 24.3* 21.2* 19.4*    151 154     CMP:   Recent Labs  Lab 03/08/18  0024 03/08/18  0555 03/08/18  1215   * 131* 133*   K 6.0* 5.9* 5.7*   CL 99 99 100   CO2 17* 19* 18*    118* 98   BUN 36* 39* 42*   CREATININE 4.0* 4.4* 4.8*   CALCIUM 9.0 9.1 8.7   PROT 5.8* 5.7* 5.4*   ALBUMIN 1.7* 1.6* 1.5*   BILITOT 0.7 0.6 0.6   ALKPHOS 96 70 66   * 96* 79*   ALT 37 37 33   ANIONGAP 17* 13 15   EGFRNONAA 13* 11* 10*     All pertinent labs within the past 24 hours have been reviewed.    Significant Imaging: I have reviewed all pertinent imaging results/findings within the past 24 hours.    Assessment/Plan:      * Cardiac arrest    Patient intubated. Vent management per CC. Lactic 5.3 down to 2.7, BNP- 27,   Troponin up from 0.02 to 2.330  Currently on NE  infusion  Continue Vanc/Zosyn   IVF infusing  Electrolyte replacement protocols initiated.  Hypothermia protocol initiated  Prognosis is poor  Currently on no sedation  Echo: EF 25% with diastolic dysfunction  EEG: Abnormal EEG due to severe diffuse cortical suppression and   slowing of the entire background with little variability.  No seizures were   Seen.  Awaiting recommendations from Critical Care on withdrawal of care.   Family meeting scheduled at 3pm        Acute hypercapnic respiratory failure    Appears to be secondary to cardiac arrest   CT scan of head shows complete loss of normal gray-white differentiation consistent with global hypoxic-ischemic event.  No evidence of intracranial hemorrhage  Critical care on board, await further recs            FUNMILAYO (acute kidney injury)    Initial creatinine 1.3  Up to 4.8  Urine output still decreased  Will monitor  Most likely due to cardiac arrest          Leukocytosis    -Initially 25, down to 8  -Blood cultures NGTD  -Cont Zosyn empirically  -Follow CXRs        Moderate malnutrition              Wounds, multiple              IVDU (intravenous drug user)    Patient reportedly was injecting heroin prior to cardiac arrest.   Was found by family not breathing/no pulse.            VTE Risk Mitigation         Ordered     heparin (porcine) injection 5,000 Units  Every 8 hours     Route:  Subcutaneous        03/06/18 0140     Medium Risk of VTE  Once      03/05/18 1832     Place NIECY hose  Until discontinued      03/05/18 1832     Place sequential compression device  Until discontinued      03/05/18 1832              Viktoriya Muniz MD  Department of Hospital Medicine   [facility]

## 2018-03-09 NOTE — PHYSICIAN QUERY
PT Name: Sarahi Marquez  MR #: [id number]     Physician Query Form - Documentation Clarification      CDS/: Shama Roy               Contact information:    This form is a permanent document in the medical record.     Query Date: March 9, 2018    By submitting this query, we are merely seeking further clarification of documentation. Please utilize your independent clinical judgment when addressing the question(s) below.    The Medical record reflects the following:    Supporting Clinical Findings Location in Medical Record   Course complicated by shock    Shock: Distributive (Sepsis) vs. Post Arrest Shock.  Low suspicion for hemmorhagic shock or obstructive shock from tamponade, pneumothorax, or VTE disease based on bedside ultrasound and chest xray    -abx for sepsis, presumed pulmonary source (aspiration)    V-Fib Cardiac Arrest in the field (ROSC ~10 minutes) + Subsequent PEA/Asystole Arrest at OSH/ED (ROSC ~10 minutes)---->most likely secondary to drugs/toxins (actively using heroin, morphine, responded to narcan) leading to aspiration pneumonia/hypoxia and myocardial insult.  Currently intubated and on vasopressors.     The lungs are symmetrically expanded.  Mild patchy opacity is seen within the right lung base.    Normal respiratory talha    Lactate, Gm 5.3 Critical Care Consult 3/5    Critical Care Consult 3/5        Critical Care Consult 3/5    Critical Care PN 3/8            CXR 3/5      Respiratory culture 3/5    Lab 3/5                                                                                Doctor, Please specify diagnosis or diagnoses associated with above clinical findings.    Provider Use Only    (  ) Septic Shock     ( X ) Post arrest shock     (  ) Both septic shock and post arrest shock     (  ) Other_________________                                                                                                         [  ] Clinically undetermined

## 2018-03-09 NOTE — NURSING
Morphine drip infusing,  Patient given Ativan and Robinul for withdrawal of care.  Family is in room, and ready for extubation.  Patient extubated at 2005 per respiratory.

## 2018-03-09 NOTE — ASSESSMENT & PLAN NOTE
Initial creatinine 1.3  Up to 4.8  Urine output still decreased  Will monitor  Most likely due to cardiac arrest

## 2018-03-09 NOTE — ASSESSMENT & PLAN NOTE
Patient intubated. Vent management per CC. Lactic 5.3 down to 2.7, BNP- 27,   Troponin up from 0.02 to 2.330  Currently on NE infusion  Continue Vanc/Zosyn   IVF infusing  Electrolyte replacement protocols initiated.  Hypothermia protocol initiated  Prognosis is poor  Currently on no sedation  Echo: EF 25% with diastolic dysfunction  EEG: Abnormal EEG due to severe diffuse cortical suppression and   slowing of the entire background with little variability.  No seizures were   Seen.  Awaiting recommendations from Critical Care on withdrawal of care.   Family meeting scheduled at 3pm

## 2018-03-09 NOTE — SUBJECTIVE & OBJECTIVE
Interval History: No acute events overnight or this morning.  Mother at bedside.  Critical Care has made plans with family to withdraw care, time of withdrawal has not been set at this time.  Family has not decided at this time.     Review of Systems   Unable to perform ROS: Intubated     Objective:     Vital Signs (Most Recent):  Temp: (!) 94.3 °F (34.6 °C) (03/08/18 1920)  Pulse: 96 (03/08/18 1920)  Resp: 20 (03/08/18 1920)  BP: (!) 82/59 (03/08/18 1800)  SpO2: 100 % (03/08/18 1920) Vital Signs (24h Range):  Temp:  [93.9 °F (34.4 °C)-99.7 °F (37.6 °C)] 94.3 °F (34.6 °C)  Pulse:  [] 96  Resp:  [15-29] 20  SpO2:  [100 %] 100 %  BP: ()/() 82/59  Arterial Line BP: ()/() 84/46     Weight: 48.9 kg (107 lb 12.9 oz)  Body mass index is 17.94 kg/m².    Intake/Output Summary (Last 24 hours) at 03/08/18 1948  Last data filed at 03/08/18 1841   Gross per 24 hour   Intake           776.59 ml   Output               80 ml   Net           696.59 ml      Physical Exam   Constitutional: She appears cachectic. She appears ill. She is intubated.   HENT:   Head: Normocephalic.   Eyes: Conjunctivae are normal. Right eye exhibits no discharge. Left eye exhibits no discharge.   Neck: Normal range of motion. Neck supple.   Cardiovascular: Normal heart sounds and intact distal pulses.  An irregular rhythm present. Tachycardia present.    Pulmonary/Chest: Effort normal. No tachypnea. She is intubated. No respiratory distress. She has no rales.   Abdominal: Soft. She exhibits no distension. Bowel sounds are decreased. There is no tenderness.   Musculoskeletal: Normal range of motion.   Neurological: She is unresponsive. GCS eye subscore is 1. GCS verbal subscore is 1. GCS motor subscore is 1.   Skin: Skin is warm and dry. Capillary refill takes more than 3 seconds. There is pallor.       Significant Labs:   ABGs: No results for input(s): PH, PCO2, HCO3, POCSATURATED, BE, TOTALHB, COHB, METHB, O2HB, POCFIO2 in  the last 48 hours.  CBC:   Recent Labs  Lab 03/08/18  0024 03/08/18  0555 03/08/18  1215   WBC 11.64 9.15 8.86   HGB 8.1* 7.2* 6.6*   HCT 24.3* 21.2* 19.4*    151 154     CMP:   Recent Labs  Lab 03/08/18  0024 03/08/18  0555 03/08/18  1215   * 131* 133*   K 6.0* 5.9* 5.7*   CL 99 99 100   CO2 17* 19* 18*    118* 98   BUN 36* 39* 42*   CREATININE 4.0* 4.4* 4.8*   CALCIUM 9.0 9.1 8.7   PROT 5.8* 5.7* 5.4*   ALBUMIN 1.7* 1.6* 1.5*   BILITOT 0.7 0.6 0.6   ALKPHOS 96 70 66   * 96* 79*   ALT 37 37 33   ANIONGAP 17* 13 15   EGFRNONAA 13* 11* 10*     All pertinent labs within the past 24 hours have been reviewed.    Significant Imaging: I have reviewed all pertinent imaging results/findings within the past 24 hours.

## 2018-03-09 NOTE — NURSING
Asystole, Absence of vital signs noted at 2039.  Levophed and Morphine stopped.  Family at bedside.  ED physician called per charge nurse to pronounce patient.

## 2018-03-09 NOTE — TREATMENT PLAN
Care Plan Update    Spoke with Ophelia Marquez at bedside (next of kind and eldest child) about the plan moving forward.  At this time, she states her entire family is all in agreement with withdrawal of care.  The family is waiting for another brother and family friend to come in later tonight around 8-9 pm before they proceed with withdrawal of care.  I subsequently placed orders for comfort medications (morphine, ativan, glycopyrrolate-->to be released by nursing once Ophelia Marquez lets her know all family and friends are present).  [facility].    Hermann Mensah MD, MSc  Pulmonary/Critical Care Fellow

## 2018-03-11 LAB
BACTERIA BLD CULT: NORMAL
BACTERIA BLD CULT: NORMAL

## 2018-04-12 NOTE — DISCHARGE SUMMARY
[facility] Hospital Medicine  Death Summary      Patient Name: Sarahi Marquez  MRN: [id number]  Admission Date: 3/5/2018  Hospital Length of Stay: 3 days  Death Date and Time:  3/8/2018 8:39pm  Attending Physician: Shaylee att. providers found   Discharging Provider: Viktoriya Muniz MD  Primary Care Provider: Primary Doctor Shaylee      HPI:   43-year-old female presents and cardiac and respiratory arrest.  Family reports patient was using heroin when earlier today and was later found by  staring blankly into space and not breathing or moving.  Family called 911 and patient was in asystole on arrival.  ACLS protocol was initiated, Prashant was placed, and patient was intubated prior to arrival to the hospital.  At arrival patient had a palpable pulse.  Several minutes later patient went into PA and CPR was again initiated.  Patient was placed on a transcutaneous pacemaker and pulse was reobtained.    Patient was transferred to [facility] for Critical Care services.  Upon arrival, an arterial line was placed via CC. Patient was initiated on Levophed due to MAP being below 65.         Hospital Course:   Patient intubated. Vent management per Pulmonary Critical Care.  Patient required pressors during entire course to maintain blood pressure. CT scan of head shows complete loss of normal gray-white differentiation consistent with global hypoxic-ischemic event.  No evidence of intracranial hemorrhage.  Findings secondary to cardiac arrest.  Started on vancomycin and zosyn during hospital course empirically for pneumonia. Hypothermia protocol initiated, but with no meaningful response. Prognosis is poor. Currently on no sedation, but with no purposeful response. Echo: EF 25% with diastolic dysfunction. EEG: Abnormal EEG due to severe diffuse cortical suppression and slowing of the entire background with little variability.  No seizures were   Seen. Family agreed to withdraw care and patient  at  2018 8:39pm, pronounced by ER physician.    Consults:   Consults         Status Ordering Provider     Inpatient consult to Pulmonary Critical Care  Once     Provider:  Hermann Mensah MD    Completed NOEMI RAY     Inpatient consult to Registered Dietitian/Nutritionist  Once     Provider:  (Not yet assigned)    Completed NOEMI RAY          No new Assessment & Plan notes have been filed under this hospital service since the last note was generated.  Service: Hospital Medicine    Final Active Diagnoses:    Diagnosis Date Noted POA    PRINCIPAL PROBLEM:  Cardiac arrest [I46.9] 2018 Yes    Acute hypercapnic respiratory failure [J96.02] 2018 Yes    Wounds, multiple [T07.XXXA] 2018 Yes    Moderate malnutrition [E44.0] 2018 Yes    Leukocytosis [D72.829] 2018 Yes    FUNMILAYO (acute kidney injury) [N17.9] 2018 Yes    IVDU (intravenous drug user) [F19.90] 2018 Yes      Problems Resolved During this Admission:    Diagnosis Date Noted Date Resolved POA       Discharged Condition:     Disposition:     Follow Up:    Patient Instructions:   No discharge procedures on file.    Significant Diagnostic Studies: Labs: All labs within the past 24 hours have been reviewed    Pending Diagnostic Studies:     None         Medications:  None (patient  at medical facility)    Indwelling Lines/Drains at time of discharge:   Lines/Drains/Airways     Central Venous Catheter Line                 Trialysis (Dialysis) Catheter 18 right internal jugular 37 days          Drain                 NG/OG Tube 18 1600 Centre sump 16 Fr. Center mouth 37 days         Urethral Catheter 18 1800 16 Fr. 37 days          Arterial Line                 Arterial Line 18 Right Femoral 37 days                Time spent on the discharge of patient: >30 minutes  Patient was seen and examined on the date of discharge and determined to be  suitable for discharge.         Viktoriya Muniz MD  Department of Hospital Medicine  [facility]